# Patient Record
Sex: FEMALE | Race: WHITE | NOT HISPANIC OR LATINO | Employment: FULL TIME | URBAN - NONMETROPOLITAN AREA
[De-identification: names, ages, dates, MRNs, and addresses within clinical notes are randomized per-mention and may not be internally consistent; named-entity substitution may affect disease eponyms.]

---

## 2022-03-25 NOTE — TELEPHONE ENCOUNTER
[facility] for refills of this patients medications:    Resuvastatin 5mg  And Diltiazen ER 180mg

## 2022-03-28 RX ORDER — ROSUVASTATIN CALCIUM 5 MG/1
5 TABLET, COATED ORAL DAILY
Qty: 90 TABLET | Refills: 1 | Status: SHIPPED | OUTPATIENT
Start: 2022-03-28 | End: 2022-03-29 | Stop reason: SDUPTHER

## 2022-03-28 RX ORDER — DILTIAZEM HYDROCHLORIDE 180 MG/1
180 CAPSULE, COATED, EXTENDED RELEASE ORAL DAILY
COMMUNITY
End: 2022-03-28 | Stop reason: SDUPTHER

## 2022-03-28 RX ORDER — DILTIAZEM HYDROCHLORIDE 180 MG/1
180 CAPSULE, COATED, EXTENDED RELEASE ORAL DAILY
Qty: 90 CAPSULE | Refills: 1 | Status: SHIPPED | OUTPATIENT
Start: 2022-03-28 | End: 2022-09-28

## 2022-03-28 RX ORDER — ROSUVASTATIN CALCIUM 5 MG/1
5 TABLET, COATED ORAL DAILY
COMMUNITY
End: 2022-03-28 | Stop reason: SDUPTHER

## 2022-03-29 RX ORDER — ROSUVASTATIN CALCIUM 5 MG/1
5 TABLET, COATED ORAL DAILY
Qty: 90 TABLET | Refills: 1 | Status: SHIPPED | OUTPATIENT
Start: 2022-03-29 | End: 2023-02-01 | Stop reason: SDUPTHER

## 2022-03-29 RX ORDER — ROSUVASTATIN CALCIUM 5 MG/1
5 TABLET, COATED ORAL DAILY
Qty: 90 TABLET | Refills: 1 | Status: CANCELLED | OUTPATIENT
Start: 2022-03-29

## 2022-04-06 ENCOUNTER — TELEPHONE (OUTPATIENT)
Dept: FAMILY MEDICINE CLINIC | Facility: CLINIC | Age: 67
End: 2022-04-06

## 2022-04-06 NOTE — TELEPHONE ENCOUNTER
Patient called today and scheduled her annual physical w/Dr. Vaz on 4/27. She would like to get her bw done on 4/20 so will need an order entered for her appt.

## 2022-04-08 DIAGNOSIS — Z00.00 WELL ADULT EXAM: Primary | ICD-10-CM

## 2022-04-20 ENCOUNTER — LAB (OUTPATIENT)
Dept: FAMILY MEDICINE CLINIC | Facility: CLINIC | Age: 67
End: 2022-04-20

## 2022-04-20 DIAGNOSIS — Z00.00 WELL ADULT EXAM: ICD-10-CM

## 2022-04-20 PROCEDURE — 36415 COLL VENOUS BLD VENIPUNCTURE: CPT | Performed by: FAMILY MEDICINE

## 2022-04-21 LAB
25(OH)D3+25(OH)D2 SERPL-MCNC: 53.9 NG/ML (ref 30–100)
ALBUMIN SERPL-MCNC: 4.8 G/DL (ref 3.8–4.8)
ALBUMIN/GLOB SERPL: 2.2 {RATIO} (ref 1.2–2.2)
ALP SERPL-CCNC: 94 IU/L (ref 44–121)
ALT SERPL-CCNC: 28 IU/L (ref 0–32)
AST SERPL-CCNC: 24 IU/L (ref 0–40)
BASOPHILS # BLD AUTO: 0.1 X10E3/UL (ref 0–0.2)
BASOPHILS NFR BLD AUTO: 1 %
BILIRUB SERPL-MCNC: 0.8 MG/DL (ref 0–1.2)
BUN SERPL-MCNC: 17 MG/DL (ref 8–27)
BUN/CREAT SERPL: 22 (ref 12–28)
CALCIUM SERPL-MCNC: 10.2 MG/DL (ref 8.7–10.3)
CHLORIDE SERPL-SCNC: 101 MMOL/L (ref 96–106)
CHOLEST SERPL-MCNC: 178 MG/DL (ref 100–199)
CO2 SERPL-SCNC: 25 MMOL/L (ref 20–29)
CREAT SERPL-MCNC: 0.78 MG/DL (ref 0.57–1)
EGFRCR SERPLBLD CKD-EPI 2021: 85 ML/MIN/1.73
EOSINOPHIL # BLD AUTO: 0.2 X10E3/UL (ref 0–0.4)
EOSINOPHIL NFR BLD AUTO: 3 %
ERYTHROCYTE [DISTWIDTH] IN BLOOD BY AUTOMATED COUNT: 12.3 % (ref 11.7–15.4)
GLOBULIN SER CALC-MCNC: 2.2 G/DL (ref 1.5–4.5)
GLUCOSE SERPL-MCNC: 104 MG/DL (ref 65–99)
HBA1C MFR BLD: 5.7 % (ref 4.8–5.6)
HCT VFR BLD AUTO: 44.9 % (ref 34–46.6)
HDLC SERPL-MCNC: 63 MG/DL
HGB BLD-MCNC: 15.1 G/DL (ref 11.1–15.9)
IMM GRANULOCYTES # BLD AUTO: 0 X10E3/UL (ref 0–0.1)
IMM GRANULOCYTES NFR BLD AUTO: 1 %
LDLC SERPL CALC-MCNC: 98 MG/DL (ref 0–99)
LYMPHOCYTES # BLD AUTO: 1.7 X10E3/UL (ref 0.7–3.1)
LYMPHOCYTES NFR BLD AUTO: 32 %
MCH RBC QN AUTO: 31.4 PG (ref 26.6–33)
MCHC RBC AUTO-ENTMCNC: 33.6 G/DL (ref 31.5–35.7)
MCV RBC AUTO: 93 FL (ref 79–97)
MONOCYTES # BLD AUTO: 0.5 X10E3/UL (ref 0.1–0.9)
MONOCYTES NFR BLD AUTO: 10 %
NEUTROPHILS # BLD AUTO: 2.7 X10E3/UL (ref 1.4–7)
NEUTROPHILS NFR BLD AUTO: 53 %
PLATELET # BLD AUTO: 399 X10E3/UL (ref 150–450)
POTASSIUM SERPL-SCNC: 4.1 MMOL/L (ref 3.5–5.2)
PROT SERPL-MCNC: 7 G/DL (ref 6–8.5)
RBC # BLD AUTO: 4.81 X10E6/UL (ref 3.77–5.28)
SODIUM SERPL-SCNC: 140 MMOL/L (ref 134–144)
TRIGL SERPL-MCNC: 95 MG/DL (ref 0–149)
TSH SERPL DL<=0.005 MIU/L-ACNC: 3.55 UIU/ML (ref 0.45–4.5)
VIT B12 SERPL-MCNC: 933 PG/ML (ref 232–1245)
VLDLC SERPL CALC-MCNC: 17 MG/DL (ref 5–40)
WBC # BLD AUTO: 5.1 X10E3/UL (ref 3.4–10.8)

## 2022-04-27 ENCOUNTER — OFFICE VISIT (OUTPATIENT)
Dept: FAMILY MEDICINE CLINIC | Facility: CLINIC | Age: 67
End: 2022-04-27

## 2022-04-27 VITALS
TEMPERATURE: 97.7 F | HEIGHT: 65 IN | WEIGHT: 134.9 LBS | OXYGEN SATURATION: 99 % | HEART RATE: 81 BPM | BODY MASS INDEX: 22.48 KG/M2 | DIASTOLIC BLOOD PRESSURE: 72 MMHG | SYSTOLIC BLOOD PRESSURE: 138 MMHG

## 2022-04-27 DIAGNOSIS — G89.29 CHRONIC RUQ PAIN: ICD-10-CM

## 2022-04-27 DIAGNOSIS — R10.11 CHRONIC RUQ PAIN: ICD-10-CM

## 2022-04-27 DIAGNOSIS — Z00.00 ENCOUNTER FOR WELLNESS EXAMINATION IN ADULT: Primary | ICD-10-CM

## 2022-04-27 DIAGNOSIS — R00.0 TACHYCARDIA: ICD-10-CM

## 2022-04-27 DIAGNOSIS — N95.1 MENOPAUSAL SYNDROME: ICD-10-CM

## 2022-04-27 DIAGNOSIS — Z86.16 HISTORY OF COVID-19: ICD-10-CM

## 2022-04-27 DIAGNOSIS — Z12.31 ENCOUNTER FOR SCREENING MAMMOGRAM FOR MALIGNANT NEOPLASM OF BREAST: ICD-10-CM

## 2022-04-27 PROBLEM — H69.83 DYSFUNCTION OF BOTH EUSTACHIAN TUBES: Status: ACTIVE | Noted: 2019-12-03

## 2022-04-27 PROBLEM — H33.103 BILATERAL RETINOSCHISIS: Status: ACTIVE | Noted: 2021-11-19

## 2022-04-27 PROBLEM — H65.92 LEFT SEROUS OTITIS MEDIA: Status: ACTIVE | Noted: 2019-12-03

## 2022-04-27 PROBLEM — H72.92 PERFORATION OF LEFT TYMPANIC MEMBRANE: Status: ACTIVE | Noted: 2019-11-15

## 2022-04-27 PROBLEM — H25.10 NUCLEAR SENILE CATARACT: Status: ACTIVE | Noted: 2021-11-19

## 2022-04-27 PROBLEM — H69.93 DYSFUNCTION OF BOTH EUSTACHIAN TUBES: Status: ACTIVE | Noted: 2019-12-03

## 2022-04-27 PROBLEM — H93.12 TINNITUS OF LEFT EAR: Status: ACTIVE | Noted: 2019-12-03

## 2022-04-27 PROBLEM — H35.373 EPIRETINAL MEMBRANE (ERM) OF BOTH EYES: Status: ACTIVE | Noted: 2021-11-19

## 2022-04-27 PROCEDURE — 99397 PER PM REEVAL EST PAT 65+ YR: CPT | Performed by: FAMILY MEDICINE

## 2022-04-27 PROCEDURE — 99214 OFFICE O/P EST MOD 30 MIN: CPT | Performed by: FAMILY MEDICINE

## 2022-04-27 RX ORDER — CHOLECALCIFEROL (VITAMIN D3) 125 MCG
CAPSULE ORAL
COMMUNITY

## 2022-04-27 RX ORDER — VALSARTAN AND HYDROCHLOROTHIAZIDE 160; 25 MG/1; MG/1
1 TABLET ORAL DAILY
COMMUNITY
Start: 2021-11-12 | End: 2022-05-23 | Stop reason: SDUPTHER

## 2022-04-27 RX ORDER — OMEPRAZOLE 20 MG/1
20 CAPSULE, DELAYED RELEASE ORAL EVERY MORNING
COMMUNITY
Start: 2022-04-22 | End: 2022-05-23 | Stop reason: SDUPTHER

## 2022-04-27 RX ORDER — UBIDECARENONE 100 MG
100 CAPSULE ORAL DAILY
COMMUNITY
End: 2023-02-01

## 2022-04-27 RX ORDER — MULTIPLE VITAMINS W/ MINERALS TAB 9MG-400MCG
1 TAB ORAL DAILY
COMMUNITY

## 2022-04-27 NOTE — PROGRESS NOTES
Patient Name: Lorena Zavala  : 1955   MRN: [id number]     Chief Complaint:    Chief Complaint   Patient presents with   • Annual Exam       History of Present Illness: Lorena Zavala is a 67 y.o. female who is here today for their annual health maintenance and physical. Patient presents for follow-up on chronic medical problems including hypertension, hyperlipidemia and GERD. Patient denies adverse effects of medications, headache, dizziness, chest pain and shortness of breath. Patient complains of palpitations and abdominal pain. Patient is here for monitoring of chronic issues and to go over recent fasting blood work.  Fasting blood work was significant only for being mildly prediabetic, all other labs were normal.  She is due for mammogram and DEXA, up-to-date on colon screening.    Patient reports persistent intermittent right upper quadrant pain that is not associated specifically with eating or certain types of foods.  She has had gallbladder ultrasound, HIDA scan, and CT abdomen pelvis with contrast over the last year all of which were normal.  The pain is intermittent but feels like an aching gnawing type pain in her right upper quadrant that radiates around her right flank.  She denies nausea, vomiting or diarrhea associated with this.    She also reports racing heart 3-4 times monthly that only happens at nighttime.  She states that she will awake from a dream and will feel her heart racing.  When she has looked at her watch her heart rate is in the 120s.  She states that she will get out of bed, go get a drink of water and lay down for about 15 minutes as this resolves.  She has never had a Holter monitor or had a sleep study to rule out sleep apnea although she denies family history of sleep apnea or known apneic spells.             Review of Systems:   Review of Systems   Constitutional: Negative for chills, fatigue and fever.   Respiratory: Negative for cough and shortness of breath.     Cardiovascular: Positive for palpitations. Negative for chest pain.   Gastrointestinal: Positive for abdominal pain. Negative for constipation, diarrhea, nausea and vomiting.   Musculoskeletal: Negative for back pain and myalgias.   Neurological: Negative for dizziness and headache.   Psychiatric/Behavioral: Negative for depressed mood. The patient is not nervous/anxious.        Past Medical History, Social History, Family History and Care Team were all reviewed with patient and updated as appropriate.     Medications:     Current Outpatient Medications:   •  Cholecalciferol (Vitamin D3) 50 MCG (2000 UT) tablet, Take  by mouth., Disp: , Rfl:   •  coenzyme Q10 100 MG capsule, Take 100 mg by mouth Daily., Disp: , Rfl:   •  dilTIAZem CD (CARDIZEM CD) 180 MG 24 hr capsule, Take 1 capsule by mouth Daily., Disp: 90 capsule, Rfl: 1  •  multivitamin with minerals (MULTIVITAMIN ADULTS 50+ PO), Take 1 tablet by mouth Daily., Disp: , Rfl:   •  omeprazole (priLOSEC) 20 MG capsule, Take 20 mg by mouth Every Morning., Disp: , Rfl:   •  rosuvastatin (CRESTOR) 5 MG tablet, Take 1 tablet by mouth Daily., Disp: 90 tablet, Rfl: 1  •  valsartan-hydrochlorothiazide (DIOVAN-HCT) 160-25 MG per tablet, Take 1 tablet by mouth Daily., Disp: , Rfl:     Allergies:   Allergies   Allergen Reactions   • Penicillins Unknown - Low Severity   • Sulfa Antibiotics Other (See Comments)         Depression: PHQ-2 Depression Screening  PHQ-2 Total Score: 0   PHQ-9 Total Score: 0     Intimate partner violence: (Screen on initial visit, pregnant women, women with injuries, older adult with injury or evidence of neglect):  • Violence can be a problem in many people's lives, so I now ask every patient about trauma or abuse they may have experienced in a relationship.  • Stress/Safety - Do you feel safe in your relationship?  • Afraid/Abused - Have you ever been in a relationship where you were threatened, hurt, or afraid?  • Friend/Family - Are your friends  "aware you have been hurt?  • Emergency Plan - Do you have a safe place to go and the resources you need in an emergency?    Osteoporosis:   • Ost menopausal women < 65 with RF (advancing age, previous fracture, glucocorticoid therapy, parental hip fracture, low body weight, current cigarette smoking, excessive alcohol consumption, rheumatoid arthritis, secondary osteoporosis [hypogonadism/premature menopause, malabsorption, chronic liver disease, IBD]).  • All women 65 or older      Physical Exam:  Vital Signs:   Vitals:    04/27/22 1331   BP: 138/72   BP Location: Left arm   Patient Position: Sitting   Cuff Size: Adult   Pulse: 81   Temp: 97.7 °F (36.5 °C)   TempSrc: Temporal   SpO2: 99%   Weight: 61.2 kg (134 lb 14.4 oz)   Height: 163.8 cm (64.5\")     Body mass index is 22.8 kg/m².     Physical Exam  Vitals and nursing note reviewed.   Constitutional:       Appearance: Normal appearance. She is normal weight.   HENT:      Head: Normocephalic and atraumatic.      Right Ear: Tympanic membrane and ear canal normal.      Left Ear: Tympanic membrane and ear canal normal.      Nose: Nose normal.      Mouth/Throat:      Mouth: Mucous membranes are moist.      Pharynx: Oropharynx is clear.   Eyes:      Conjunctiva/sclera: Conjunctivae normal.      Pupils: Pupils are equal, round, and reactive to light.   Cardiovascular:      Rate and Rhythm: Normal rate and regular rhythm.      Heart sounds: Normal heart sounds. No murmur heard.  Pulmonary:      Effort: Pulmonary effort is normal.      Breath sounds: Normal breath sounds. No wheezing, rhonchi or rales.   Abdominal:      General: Bowel sounds are normal.      Palpations: Abdomen is soft.      Tenderness: There is no abdominal tenderness.   Musculoskeletal:         General: Normal range of motion.      Cervical back: Normal range of motion and neck supple.      Right lower leg: No edema.      Left lower leg: No edema.   Lymphadenopathy:      Cervical: No cervical " adenopathy.   Skin:     General: Skin is warm.      Findings: No rash.   Neurological:      General: No focal deficit present.      Mental Status: She is alert and oriented to person, place, and time.      Motor: No weakness.   Psychiatric:         Mood and Affect: Mood normal.         Behavior: Behavior normal.         Procedures      Assessment/Plan:   Diagnoses and all orders for this visit:    1. Encounter for wellness examination in adult (Primary)  Assessment & Plan:  Patient has already had fasting labs.  We will set her up for mammogram and DEXA, up-to-date on colon screening      2. Encounter for screening mammogram for malignant neoplasm of breast  -     Mammo Screening Bilateral With CAD; Future    3. Menopausal syndrome  -     DEXA Bone Density Axial; Future    4. History of COVID-19  -     SARS-CoV-2 Semi-Quant Total Ab; Future    5. Chronic RUQ pain  Assessment & Plan:  Will repeat CT abdomen pelvis with and without contrast to reassess since it has been more than 1 year since her last imaging study.    Orders:  -     CT Abdomen Pelvis With & Without Contrast; Future    6. Tachycardia  Assessment & Plan:  We discussed options including referral to cardiology for Holter monitor as well as sleep study to rule out GABRIELA since symptoms only happen at nighttime.  She would like to hold off on any further work-up at this time.           Follow Up:   Return if symptoms worsen or fail to improve.    Healthcare Maintenance:   Counseling provided on Discussed injury prevention, diet and exercise, safe sexual practices, and screening for common diseases. Encouraged use of sunscreen and seatbelts. Encouraged SBE, avoidance of tobacco, limiting alcohol, and yearly dental and eye exams.   Lorena Zavala voices understanding and acceptance of this advice and will call back with any further questions or concerns. AVS with preventive healthcare tips printed for patient.     Bonita Vaz, DO  [facility]  [facility]

## 2022-04-29 LAB
SARS-COV-2 AB SERPL IA-ACNC: NORMAL U/ML
SARS-COV-2 AB SERPL IA-ACNC: NORMAL U/ML
SARS-COV-2 AB SERPL-IMP: POSITIVE

## 2022-05-23 RX ORDER — OMEPRAZOLE 20 MG/1
20 CAPSULE, DELAYED RELEASE ORAL EVERY MORNING
Qty: 90 CAPSULE | Refills: 1 | Status: SHIPPED | OUTPATIENT
Start: 2022-05-23 | End: 2022-11-14

## 2022-05-23 RX ORDER — VALSARTAN AND HYDROCHLOROTHIAZIDE 160; 25 MG/1; MG/1
1 TABLET ORAL DAILY
Qty: 90 TABLET | Refills: 1 | Status: SHIPPED | OUTPATIENT
Start: 2022-05-23 | End: 2022-12-01 | Stop reason: SDUPTHER

## 2022-09-28 RX ORDER — DILTIAZEM HYDROCHLORIDE 180 MG/1
CAPSULE, EXTENDED RELEASE ORAL
Qty: 90 CAPSULE | Refills: 1 | Status: SHIPPED | OUTPATIENT
Start: 2022-09-28 | End: 2023-01-03

## 2022-11-14 RX ORDER — OMEPRAZOLE 20 MG/1
20 CAPSULE, DELAYED RELEASE ORAL EVERY MORNING
Qty: 90 CAPSULE | Refills: 1 | Status: SHIPPED | OUTPATIENT
Start: 2022-11-14 | End: 2023-02-01 | Stop reason: SDUPTHER

## 2022-12-01 RX ORDER — VALSARTAN AND HYDROCHLOROTHIAZIDE 160; 25 MG/1; MG/1
1 TABLET ORAL DAILY
Qty: 90 TABLET | Refills: 1 | Status: SHIPPED | OUTPATIENT
Start: 2022-12-01 | End: 2023-02-01 | Stop reason: SDUPTHER

## 2023-01-03 RX ORDER — DILTIAZEM HYDROCHLORIDE 180 MG/1
CAPSULE, EXTENDED RELEASE ORAL
Qty: 90 CAPSULE | Refills: 1 | Status: SHIPPED | OUTPATIENT
Start: 2023-01-03 | End: 2023-02-01 | Stop reason: SDUPTHER

## 2023-02-01 ENCOUNTER — OFFICE VISIT (OUTPATIENT)
Dept: FAMILY MEDICINE CLINIC | Facility: CLINIC | Age: 68
End: 2023-02-01
Payer: MEDICARE

## 2023-02-01 VITALS
DIASTOLIC BLOOD PRESSURE: 80 MMHG | TEMPERATURE: 97.7 F | BODY MASS INDEX: 22.42 KG/M2 | HEART RATE: 89 BPM | OXYGEN SATURATION: 99 % | WEIGHT: 134.6 LBS | SYSTOLIC BLOOD PRESSURE: 138 MMHG | HEIGHT: 65 IN

## 2023-02-01 DIAGNOSIS — R10.11 RIGHT UPPER QUADRANT ABDOMINAL PAIN: Primary | ICD-10-CM

## 2023-02-01 DIAGNOSIS — H65.93 MIDDLE EAR EFFUSION, BILATERAL: ICD-10-CM

## 2023-02-01 DIAGNOSIS — G89.29 CHRONIC RUQ PAIN: ICD-10-CM

## 2023-02-01 DIAGNOSIS — R10.11 CHRONIC RUQ PAIN: ICD-10-CM

## 2023-02-01 PROCEDURE — 99214 OFFICE O/P EST MOD 30 MIN: CPT | Performed by: FAMILY MEDICINE

## 2023-02-01 PROCEDURE — 96372 THER/PROPH/DIAG INJ SC/IM: CPT | Performed by: FAMILY MEDICINE

## 2023-02-01 RX ORDER — DILTIAZEM HYDROCHLORIDE 180 MG/1
180 CAPSULE, EXTENDED RELEASE ORAL DAILY
Qty: 90 CAPSULE | Refills: 3 | Status: SHIPPED | OUTPATIENT
Start: 2023-02-01

## 2023-02-01 RX ORDER — VALSARTAN AND HYDROCHLOROTHIAZIDE 160; 25 MG/1; MG/1
1 TABLET ORAL DAILY
Qty: 90 TABLET | Refills: 3 | Status: SHIPPED | OUTPATIENT
Start: 2023-02-01

## 2023-02-01 RX ORDER — ROSUVASTATIN CALCIUM 5 MG/1
5 TABLET, COATED ORAL DAILY
Qty: 90 TABLET | Refills: 3 | Status: SHIPPED | OUTPATIENT
Start: 2023-02-01

## 2023-02-01 RX ORDER — TRIAMCINOLONE ACETONIDE 40 MG/ML
80 INJECTION, SUSPENSION INTRA-ARTICULAR; INTRAMUSCULAR ONCE
Status: COMPLETED | OUTPATIENT
Start: 2023-02-01 | End: 2023-02-01

## 2023-02-01 RX ORDER — OMEPRAZOLE 20 MG/1
20 CAPSULE, DELAYED RELEASE ORAL EVERY MORNING
Qty: 90 CAPSULE | Refills: 3 | Status: SHIPPED | OUTPATIENT
Start: 2023-02-01

## 2023-02-01 RX ORDER — METHYLPREDNISOLONE 4 MG/1
TABLET ORAL
Qty: 21 TABLET | Refills: 0 | Status: SHIPPED | OUTPATIENT
Start: 2023-02-01

## 2023-02-01 RX ADMIN — TRIAMCINOLONE ACETONIDE 80 MG: 40 INJECTION, SUSPENSION INTRA-ARTICULAR; INTRAMUSCULAR at 10:40

## 2023-02-01 NOTE — ASSESSMENT & PLAN NOTE
Patient received Kenalog in office and Rx Medrol pack.  She will call if symptoms are persistent or worsening

## 2023-02-01 NOTE — PROGRESS NOTES
Date: 2023   Patient Name: Lorena Zavala  : 1955   MRN: [id number]     Chief Complaint:    Chief Complaint   Patient presents with   • Abdominal Pain     Right upper quadrant pain        History of Present Illness: Lorena Zavala is a 67 y.o. female who is here today for Chronic right upper quadrant abdominal pain.  This has been going on for about 2 years now.  She has had extensive work-up including CT abdomen pelvis, abdominal ultrasound, and MRI of abdomen which have all been normal.  She continues to have right upper quadrant pain which has now progressed and radiates to her back at times.  This remains dull and is never intolerable.  She has had no changes in bowel movements and denies uncontrolled acid reflux.  She has seen GI as well who has been unable to determine an etiology.  She would like to discuss what other imaging options are available for work-up.    She also complains today of bilateral otalgia right worse than left.  She has history of middle ear effusion and tympanic membrane rupture.  She has had a viral upper respiratory infection for about 10 days which is when her otalgia started.           Review of Systems:   Review of Systems   Constitutional: Negative for chills, fatigue and fever.   HENT: Positive for ear pain, postnasal drip, sore throat and swollen glands.    Respiratory: Negative for cough and shortness of breath.    Cardiovascular: Negative for chest pain and palpitations.   Gastrointestinal: Positive for abdominal pain. Negative for constipation, diarrhea, nausea and vomiting.   Musculoskeletal: Negative for back pain and myalgias.   Neurological: Negative for dizziness and headache.   Psychiatric/Behavioral: Negative for depressed mood. The patient is not nervous/anxious.        Past Medical History:   Past Medical History:   Diagnosis Date   • Sanders syndrome    • Hyperlipidemia    • Hypertension        Past Surgical History:   Past Surgical History:    Procedure Laterality Date   • APPENDECTOMY     • BUNIONECTOMY     •  SECTION     • EAR TUBES     • MOLE REMOVAL     • TONSILLECTOMY         Family History:   Family History   Problem Relation Age of Onset   • Stroke Mother 80   • Heart attack Father 55   • Heart disease Father    • Stroke Maternal Grandmother    • Heart attack Maternal Grandfather    • Diabetes Paternal Grandmother    • Stroke Paternal Grandfather        Social History:   Social History     Socioeconomic History   • Marital status:      Spouse name: Ed   • Number of children: 1   Tobacco Use   • Smoking status: Never   • Smokeless tobacco: Never   Vaping Use   • Vaping Use: Never used   Substance and Sexual Activity   • Alcohol use: Yes     Alcohol/week: 2.0 standard drinks     Types: 1 Glasses of wine, 1 Cans of beer per week   • Drug use: Never   • Sexual activity: Defer       Medications:     Current Outpatient Medications:   •  Cholecalciferol (Vitamin D3) 50 MCG (2000) tablet, Take  by mouth., Disp: , Rfl:   •  dilTIAZem XR (DILACOR XR) 180 MG 24 hr capsule, Take 1 capsule by mouth Daily., Disp: 90 capsule, Rfl: 3  •  multivitamin with minerals tablet tablet, Take 1 tablet by mouth Daily., Disp: , Rfl:   •  omeprazole (priLOSEC) 20 MG capsule, Take 1 capsule by mouth Every Morning., Disp: 90 capsule, Rfl: 3  •  rosuvastatin (CRESTOR) 5 MG tablet, Take 1 tablet by mouth Daily., Disp: 90 tablet, Rfl: 3  •  valsartan-hydrochlorothiazide (DIOVAN-HCT) 160-25 MG per tablet, Take 1 tablet by mouth Daily., Disp: 90 tablet, Rfl: 3  •  methylPREDNISolone (MEDROL) 4 MG dose pack, Take as directed on package instructions., Disp: 21 tablet, Rfl: 0    Current Facility-Administered Medications:   •  triamcinolone acetonide (KENALOG-40) injection 80 mg, 80 mg, Intramuscular, Once, Bonita Vaz,     Allergies:   Allergies   Allergen Reactions   • Penicillins Unknown - Low Severity   • Sulfa Antibiotics Other (See Comments)  "        Physical Exam:  Vital Signs:   Vitals:    02/01/23 0933 02/01/23 1021   BP: 152/88 138/80   BP Location: Left arm    Patient Position: Sitting    Cuff Size: Adult    Pulse: 89    Temp: 97.7 °F (36.5 °C)    TempSrc: Temporal    SpO2: 99%    Weight: 61.1 kg (134 lb 9.6 oz)    Height: 163.8 cm (64.5\")      Body mass index is 22.75 kg/m².     Physical Exam  Vitals and nursing note reviewed.   Constitutional:       Appearance: Normal appearance.   HENT:      Right Ear: A middle ear effusion is present.      Left Ear: A middle ear effusion is present.   Cardiovascular:      Rate and Rhythm: Normal rate and regular rhythm.      Heart sounds: Normal heart sounds. No murmur heard.  Pulmonary:      Effort: Pulmonary effort is normal.      Breath sounds: Normal breath sounds. No wheezing.   Abdominal:      General: Bowel sounds are normal. There is no distension.      Palpations: Abdomen is soft.      Tenderness: There is abdominal tenderness.   Skin:     General: Skin is warm.   Neurological:      Mental Status: She is alert and oriented to person, place, and time. Mental status is at baseline.   Psychiatric:         Mood and Affect: Mood normal.         Behavior: Behavior normal.           Assessment/Plan:   Diagnoses and all orders for this visit:    1. Right upper quadrant abdominal pain (Primary)  Assessment & Plan:  Etiology remains unclear, we will move forward with CT angiogram for further evaluation    Orders:  -     CT Angiogram Abdomen Pelvis With & Without Contrast; Future    2. Middle ear effusion, bilateral  Assessment & Plan:  Patient received Kenalog in office and Rx Medrol pack.  She will call if symptoms are persistent or worsening    Orders:  -     methylPREDNISolone (MEDROL) 4 MG dose pack; Take as directed on package instructions.  Dispense: 21 tablet; Refill: 0  -     triamcinolone acetonide (KENALOG-40) injection 80 mg    Other orders  -     dilTIAZem XR (DILACOR XR) 180 MG 24 hr capsule; Take 1 " capsule by mouth Daily.  Dispense: 90 capsule; Refill: 3  -     omeprazole (priLOSEC) 20 MG capsule; Take 1 capsule by mouth Every Morning.  Dispense: 90 capsule; Refill: 3  -     rosuvastatin (CRESTOR) 5 MG tablet; Take 1 tablet by mouth Daily.  Dispense: 90 tablet; Refill: 3  -     valsartan-hydrochlorothiazide (DIOVAN-HCT) 160-25 MG per tablet; Take 1 tablet by mouth Daily.  Dispense: 90 tablet; Refill: 3         Follow Up:   Return if symptoms worsen or fail to improve.    Bonita Vaz DO  [facility] [place] [facility]

## 2023-02-03 ENCOUNTER — TELEPHONE (OUTPATIENT)
Dept: FAMILY MEDICINE CLINIC | Facility: CLINIC | Age: 68
End: 2023-02-03
Payer: MEDICARE

## 2023-02-03 DIAGNOSIS — R10.11 CHRONIC RUQ PAIN: Primary | ICD-10-CM

## 2023-02-03 DIAGNOSIS — G89.29 CHRONIC RUQ PAIN: Primary | ICD-10-CM

## 2023-02-03 NOTE — TELEPHONE ENCOUNTER
Patient was scheduled for Ct angiogram for Wednesday 2/8/2023.  She said they are requesting lab work(creatinine) to be done prior. There are no active orders in patient's chart.  She is requesting that Dr. Vaz enter lab orders so that she can get those done prior to her appointment Wednesday.  She would like a call back. Ph: [phone]

## 2023-02-06 PROCEDURE — 36415 COLL VENOUS BLD VENIPUNCTURE: CPT | Performed by: FAMILY MEDICINE

## 2023-02-07 ENCOUNTER — TELEPHONE (OUTPATIENT)
Dept: FAMILY MEDICINE CLINIC | Facility: CLINIC | Age: 68
End: 2023-02-07

## 2023-02-07 ENCOUNTER — TELEPHONE (OUTPATIENT)
Dept: FAMILY MEDICINE CLINIC | Facility: CLINIC | Age: 68
End: 2023-02-07
Payer: MEDICARE

## 2023-02-07 LAB
BUN SERPL-MCNC: 20 MG/DL (ref 8–27)
BUN/CREAT SERPL: 33 (ref 12–28)
CALCIUM SERPL-MCNC: 10.1 MG/DL (ref 8.7–10.3)
CHLORIDE SERPL-SCNC: 101 MMOL/L (ref 96–106)
CO2 SERPL-SCNC: 23 MMOL/L (ref 20–29)
CREAT SERPL-MCNC: 0.61 MG/DL (ref 0.57–1)
EGFRCR SERPLBLD CKD-EPI 2021: 99 ML/MIN/1.73
GLUCOSE SERPL-MCNC: 100 MG/DL (ref 70–99)
POTASSIUM SERPL-SCNC: 4.4 MMOL/L (ref 3.5–5.2)
SODIUM SERPL-SCNC: 139 MMOL/L (ref 134–144)

## 2023-03-07 DIAGNOSIS — R92.8 ABNORMAL MAMMOGRAM OF RIGHT BREAST: Primary | ICD-10-CM

## 2023-03-08 ENCOUNTER — TELEPHONE (OUTPATIENT)
Dept: FAMILY MEDICINE CLINIC | Facility: CLINIC | Age: 68
End: 2023-03-08

## 2023-03-16 ENCOUNTER — TELEPHONE (OUTPATIENT)
Dept: FAMILY MEDICINE CLINIC | Facility: CLINIC | Age: 68
End: 2023-03-16

## 2023-03-16 NOTE — TELEPHONE ENCOUNTER
Caller: Lorena Zavala    Relationship: Self    Best call back number  [phone]    What was the call regarding:   PATIENT WOULD LIKE A CALL BACK REGARDING THE STATUS OF HER MAMMOGRAM AND GETTING A APPOINTMENT SCHEDULED     Do you require a callback: YES     
Patient refused/Other (specify)

## 2023-03-22 DIAGNOSIS — R92.8 ABNORMAL MAMMOGRAM OF RIGHT BREAST: Primary | ICD-10-CM

## 2023-05-24 ENCOUNTER — OFFICE VISIT (OUTPATIENT)
Dept: FAMILY MEDICINE CLINIC | Facility: CLINIC | Age: 68
End: 2023-05-24
Payer: MEDICARE

## 2023-05-24 VITALS
HEART RATE: 61 BPM | BODY MASS INDEX: 21.99 KG/M2 | WEIGHT: 132 LBS | SYSTOLIC BLOOD PRESSURE: 130 MMHG | OXYGEN SATURATION: 98 % | DIASTOLIC BLOOD PRESSURE: 80 MMHG | HEIGHT: 65 IN

## 2023-05-24 DIAGNOSIS — Z00.00 ENCOUNTER FOR WELLNESS EXAMINATION IN ADULT: Primary | ICD-10-CM

## 2023-05-24 DIAGNOSIS — Z09 ENCOUNTER FOR FOLLOW-UP EXAMINATION AFTER COMPLETED TREATMENT FOR CONDITIONS OTHER THAN MALIGNANT NEOPLASM: ICD-10-CM

## 2023-05-24 DIAGNOSIS — E55.9 VITAMIN D DEFICIENCY: ICD-10-CM

## 2023-05-24 NOTE — PROGRESS NOTES
The ABCs of the Annual Wellness Visit  Welcome to Medicare Wellness Visit    Subjective     Lorena Zavala is a 68 y.o. female who presents for an Initial Medicare Wellness Visit.  Chronic medical conditions include hyperlipidemia, hypertension, GERD, and vitamin D deficiency. She is due for DEXA.     The following portions of the patient's history were reviewed and   updated as appropriate: allergies, current medications, past family history, past medical history, past social history, past surgical history and problem list.     Compared to one year ago, the patient feels her physical   health is the same.    Compared to one year ago, the patient feels her mental   health is the same.    Recent Hospitalizations:  She was not admitted to the hospital during the last year.       Current Medical Providers:  Patient Care Team:  Bonita Vaz,  as PCP - General (Family Medicine)    Outpatient Medications Prior to Visit   Medication Sig Dispense Refill   • Cholecalciferol (Vitamin D3) 50 MCG (2000 UT) tablet Take  by mouth.     • dilTIAZem XR (DILACOR XR) 180 MG 24 hr capsule Take 1 capsule by mouth Daily. 90 capsule 3   • multivitamin with minerals tablet tablet Take 1 tablet by mouth Daily.     • omeprazole (priLOSEC) 20 MG capsule Take 1 capsule by mouth Every Morning. 90 capsule 3   • rosuvastatin (CRESTOR) 5 MG tablet Take 1 tablet by mouth Daily. 90 tablet 3   • valsartan-hydrochlorothiazide (DIOVAN-HCT) 160-25 MG per tablet Take 1 tablet by mouth Daily. 90 tablet 3   • methylPREDNISolone (MEDROL) 4 MG dose pack Take as directed on package instructions. 21 tablet 0     No facility-administered medications prior to visit.       No opioid medication identified on active medication list. I have reviewed chart for other potential  high risk medication/s and harmful drug interactions in the elderly.          Aspirin is not on active medication list.  Aspirin use is not indicated based on review of  "current medical condition/s. Risk of harm outweighs potential benefits.  .    Patient Active Problem List   Diagnosis   • Dysfunction of both eustachian tubes   • Epiretinal membrane (ERM) of both eyes   • Bilateral retinoschisis   • Left serous otitis media   • Nuclear senile cataract   • Perforation of left tympanic membrane   • Tinnitus of left ear   • Encounter for wellness examination in adult   • Encounter for screening mammogram for malignant neoplasm of breast   • Menopausal syndrome   • History of COVID-19   • Right upper quadrant abdominal pain   • Tachycardia   • Middle ear effusion, bilateral     Advance Care Planning   Advance Care Planning     Advance Directive is not on file.  ACP discussion was held with the patient during this visit. Patient has an advance directive (not in EMR), copy requested.       Objective    Vitals:    05/24/23 1041   BP: 130/80   BP Location: Right arm   Patient Position: Sitting   Cuff Size: Adult   Pulse: 61   SpO2: 98%   Weight: 59.9 kg (132 lb)   Height: 163.8 cm (64.5\")     Estimated body mass index is 22.31 kg/m² as calculated from the following:    Height as of this encounter: 163.8 cm (64.5\").    Weight as of this encounter: 59.9 kg (132 lb).    BMI is within normal parameters. No other follow-up for BMI required.      Does the patient have evidence of cognitive impairment?   No          HEALTH RISK ASSESSMENT    Smoking Status:  Social History     Tobacco Use   Smoking Status Never   Smokeless Tobacco Never     Alcohol Consumption:  Social History     Substance and Sexual Activity   Alcohol Use Yes   • Alcohol/week: 2.0 standard drinks   • Types: 1 Glasses of wine, 1 Cans of beer per week     Fall Risk Screen:    STEADI Fall Risk Assessment was completed, and patient is at LOW risk for falls.Assessment completed on:5/24/2023    Depression Screen:       5/24/2023    10:42 AM   PHQ-2/PHQ-9 Depression Screening   Little Interest or Pleasure in Doing Things 0-->not at " all   Feeling Down, Depressed or Hopeless 0-->not at all   PHQ-9: Brief Depression Severity Measure Score 0       Health Habits and Functional and Cognitive Screenin/24/2023    10:44 AM   Functional & Cognitive Status   Do you have difficulty preparing food and eating? No   Do you have difficulty bathing yourself, getting dressed or grooming yourself? No   Do you have difficulty using the toilet? No   Do you have difficulty moving around from place to place? No   Do you have trouble with steps or getting out of a bed or a chair? No   Current Diet Well Balanced Diet   Dental Exam Up to date   Eye Exam Not up to date   Exercise (times per week) 3 times per week   Current Exercises Include Walking   Do you need help using the phone?  No   Are you deaf or do you have serious difficulty hearing?  No   Do you need help with transportation? No   Do you need help shopping? No   Do you need help with housework?  No   Do you need help with laundry? No   Do you need help taking your medications? No   Do you need help managing money? No   Do you ever drive or ride in a car without wearing a seat belt? No   Have you felt unusual stress, anger or loneliness in the last month? Yes   Who do you live with? Spouse   If you need help, do you have trouble finding someone available to you? No   Have you been bothered in the last four weeks by sexual problems? No   Do you have difficulty concentrating, remembering or making decisions? No       Age-appropriate Screening Schedule:  Refer to the list below for future screening recommendations based on patient's age, sex and/or medical conditions. Orders for these recommended tests are listed in the plan section. The patient has been provided with a written plan.    Health Maintenance   Topic Date Due   • DXA SCAN  Never done   • HEPATITIS C SCREENING  Never done   • ANNUAL WELLNESS VISIT  Never done   • LIPID PANEL  2023   • ZOSTER VACCINE (1 of 2) 2023 (Originally  "4/25/2007)   • COVID-19 Vaccine (1) 02/07/2024 (Originally 1955)   • Pneumococcal Vaccine 65+ (1 - PCV) 05/24/2024 (Originally 4/25/2022)   • INFLUENZA VACCINE  08/01/2023   • MAMMOGRAM  04/06/2025   • COLORECTAL CANCER SCREENING  09/29/2031   • TDAP/TD VACCINES  Discontinued          CMS Preventative Services Quick Reference  Risk Factors Identified During Encounter    None Identified    The above risks/problems have been discussed with the patient.  Pertinent information has been shared with the patient in the After Visit Summary.  An After Visit Summary and PPPS were made available to the patient.  Diagnoses and all orders for this visit:    1. Encounter for wellness examination in adult (Primary)  Assessment & Plan:  Fasting labs today, DEXA ordered    Orders:  -     Hemoglobin A1c; Future  -     CBC Auto Differential; Future  -     Comprehensive Metabolic Panel; Future  -     Lipid Panel; Future  -     TSH; Future  -     T4, Free; Future    2. Vitamin D deficiency  -     Vitamin D,25-Hydroxy; Future    3. Encounter for follow-up examination after completed treatment for conditions other than malignant neoplasm  -     DEXA Bone Density Axial; Future    Follow Up:  Next Medicare Wellness visit to be scheduled in 1 year.            Review of Systems   Constitutional: Negative for chills, fatigue and fever.   Respiratory: Negative for cough, shortness of breath and wheezing.    Cardiovascular: Negative for chest pain, palpitations and leg swelling.   Gastrointestinal: Negative for abdominal pain, constipation, diarrhea, nausea and vomiting.   Skin: Negative for rash.   Neurological: Negative for dizziness and weakness.   Psychiatric/Behavioral: The patient is not nervous/anxious.        Objective   Vital Signs:  /80 (BP Location: Right arm, Patient Position: Sitting, Cuff Size: Adult)   Pulse 61   Ht 163.8 cm (64.5\")   Wt 59.9 kg (132 lb)   SpO2 98%   BMI 22.31 kg/m²     Physical Exam  Vitals and " nursing note reviewed.   Constitutional:       Appearance: Normal appearance. She is normal weight.   HENT:      Head: Normocephalic and atraumatic.      Right Ear: Tympanic membrane and ear canal normal.      Left Ear: Tympanic membrane and ear canal normal.      Nose: Nose normal.      Mouth/Throat:      Mouth: Mucous membranes are moist.      Pharynx: Oropharynx is clear.   Eyes:      Conjunctiva/sclera: Conjunctivae normal.      Pupils: Pupils are equal, round, and reactive to light.   Cardiovascular:      Rate and Rhythm: Normal rate and regular rhythm.      Heart sounds: Normal heart sounds. No murmur heard.  Pulmonary:      Effort: Pulmonary effort is normal.      Breath sounds: Normal breath sounds. No wheezing, rhonchi or rales.   Abdominal:      General: Bowel sounds are normal.      Palpations: Abdomen is soft.      Tenderness: There is no abdominal tenderness.   Musculoskeletal:         General: Normal range of motion.      Cervical back: Normal range of motion and neck supple.      Right lower leg: No edema.      Left lower leg: No edema.   Lymphadenopathy:      Cervical: No cervical adenopathy.   Skin:     General: Skin is warm.      Findings: No rash.   Neurological:      General: No focal deficit present.      Mental Status: She is alert and oriented to person, place, and time.      Motor: No weakness.   Psychiatric:         Mood and Affect: Mood normal.         Behavior: Behavior normal.                         Assessment and Plan   Diagnoses and all orders for this visit:    1. Encounter for wellness examination in adult (Primary)  Assessment & Plan:  Fasting labs today, DEXA ordered    Orders:  -     Hemoglobin A1c; Future  -     CBC Auto Differential; Future  -     Comprehensive Metabolic Panel; Future  -     Lipid Panel; Future  -     TSH; Future  -     T4, Free; Future    2. Vitamin D deficiency  -     Vitamin D,25-Hydroxy; Future    3. Encounter for follow-up examination after completed  treatment for conditions other than malignant neoplasm  -     DEXA Bone Density Axial; Future           Follow Up   Return in about 1 year (around 5/24/2024) for Annual physical.  Patient was given instructions and counseling regarding her condition or for health maintenance advice. Please see specific information pulled into the AVS if appropriate.

## 2023-06-01 LAB
25(OH)D3+25(OH)D2 SERPL-MCNC: 60.5 NG/ML (ref 30–100)
ALBUMIN SERPL-MCNC: 4.7 G/DL (ref 3.8–4.8)
ALBUMIN/GLOB SERPL: 2.2 {RATIO} (ref 1.2–2.2)
ALP SERPL-CCNC: 91 IU/L (ref 44–121)
ALT SERPL-CCNC: 20 IU/L (ref 0–32)
AST SERPL-CCNC: 20 IU/L (ref 0–40)
BASOPHILS # BLD AUTO: 0.1 X10E3/UL (ref 0–0.2)
BASOPHILS NFR BLD AUTO: 1 %
BILIRUB SERPL-MCNC: 1.2 MG/DL (ref 0–1.2)
BUN SERPL-MCNC: 14 MG/DL (ref 8–27)
BUN/CREAT SERPL: 17 (ref 12–28)
CALCIUM SERPL-MCNC: 10.3 MG/DL (ref 8.7–10.3)
CHLORIDE SERPL-SCNC: 99 MMOL/L (ref 96–106)
CHOLEST SERPL-MCNC: 191 MG/DL (ref 100–199)
CO2 SERPL-SCNC: 25 MMOL/L (ref 20–29)
CREAT SERPL-MCNC: 0.82 MG/DL (ref 0.57–1)
EGFRCR SERPLBLD CKD-EPI 2021: 79 ML/MIN/1.73
EOSINOPHIL # BLD AUTO: 0.1 X10E3/UL (ref 0–0.4)
EOSINOPHIL NFR BLD AUTO: 1 %
ERYTHROCYTE [DISTWIDTH] IN BLOOD BY AUTOMATED COUNT: 12.5 % (ref 11.7–15.4)
GLOBULIN SER CALC-MCNC: 2.1 G/DL (ref 1.5–4.5)
GLUCOSE SERPL-MCNC: 102 MG/DL (ref 70–99)
HBA1C MFR BLD: 5.6 % (ref 4.8–5.6)
HCT VFR BLD AUTO: 42.2 % (ref 34–46.6)
HDLC SERPL-MCNC: 78 MG/DL
HGB BLD-MCNC: 14.4 G/DL (ref 11.1–15.9)
IMM GRANULOCYTES # BLD AUTO: 0 X10E3/UL (ref 0–0.1)
IMM GRANULOCYTES NFR BLD AUTO: 0 %
LDLC SERPL CALC-MCNC: 99 MG/DL (ref 0–99)
LYMPHOCYTES # BLD AUTO: 1.4 X10E3/UL (ref 0.7–3.1)
LYMPHOCYTES NFR BLD AUTO: 26 %
MCH RBC QN AUTO: 32.4 PG (ref 26.6–33)
MCHC RBC AUTO-ENTMCNC: 34.1 G/DL (ref 31.5–35.7)
MCV RBC AUTO: 95 FL (ref 79–97)
MONOCYTES # BLD AUTO: 0.6 X10E3/UL (ref 0.1–0.9)
MONOCYTES NFR BLD AUTO: 12 %
NEUTROPHILS # BLD AUTO: 3.3 X10E3/UL (ref 1.4–7)
NEUTROPHILS NFR BLD AUTO: 60 %
PLATELET # BLD AUTO: 381 X10E3/UL (ref 150–450)
POTASSIUM SERPL-SCNC: 4.4 MMOL/L (ref 3.5–5.2)
PROT SERPL-MCNC: 6.8 G/DL (ref 6–8.5)
RBC # BLD AUTO: 4.44 X10E6/UL (ref 3.77–5.28)
SODIUM SERPL-SCNC: 140 MMOL/L (ref 134–144)
T4 FREE SERPL-MCNC: 1.6 NG/DL (ref 0.82–1.77)
TRIGL SERPL-MCNC: 78 MG/DL (ref 0–149)
TSH SERPL DL<=0.005 MIU/L-ACNC: 2.32 UIU/ML (ref 0.45–4.5)
VLDLC SERPL CALC-MCNC: 14 MG/DL (ref 5–40)
WBC # BLD AUTO: 5.5 X10E3/UL (ref 3.4–10.8)

## 2023-07-18 PROBLEM — M79.672 LEFT FOOT PAIN: Status: ACTIVE | Noted: 2023-07-18

## 2023-07-18 PROBLEM — M25.572 ACUTE LEFT ANKLE PAIN: Status: ACTIVE | Noted: 2023-07-18

## 2023-07-19 ENCOUNTER — TELEPHONE (OUTPATIENT)
Dept: FAMILY MEDICINE CLINIC | Facility: CLINIC | Age: 68
End: 2023-07-19

## 2023-07-19 NOTE — TELEPHONE ENCOUNTER
Caller: Lorena Zavala    Relationship: Self    Best call back number: [phone]     What test was performed:  XR FOOT 3+ VIEW LEFT   XR ANKLE 3+ VIEW LEFT     When was the test performed: 07/18/2023    Where was the test performed: [facility]     Additional notes:   PATIENT WOULD LIKE A CALL BACK REGARDING THE RESULTS OF HER XR OF THE FOOT AND ANKLE DONE ON 07/18/2023

## 2023-10-03 DIAGNOSIS — R00.0 TACHYCARDIA: Primary | ICD-10-CM

## 2024-02-08 ENCOUNTER — OFFICE VISIT (OUTPATIENT)
Dept: FAMILY MEDICINE CLINIC | Facility: CLINIC | Age: 69
End: 2024-02-08
Payer: MEDICARE

## 2024-02-08 VITALS
BODY MASS INDEX: 21.83 KG/M2 | HEART RATE: 86 BPM | DIASTOLIC BLOOD PRESSURE: 70 MMHG | WEIGHT: 131 LBS | OXYGEN SATURATION: 99 % | TEMPERATURE: 96.8 F | HEIGHT: 65 IN | SYSTOLIC BLOOD PRESSURE: 130 MMHG

## 2024-02-08 DIAGNOSIS — Z01.818 PREOP EXAMINATION: Primary | ICD-10-CM

## 2024-02-08 PROCEDURE — 99213 OFFICE O/P EST LOW 20 MIN: CPT | Performed by: PHYSICIAN ASSISTANT

## 2024-02-08 PROCEDURE — 1160F RVW MEDS BY RX/DR IN RCRD: CPT | Performed by: PHYSICIAN ASSISTANT

## 2024-02-08 PROCEDURE — 1159F MED LIST DOCD IN RCRD: CPT | Performed by: PHYSICIAN ASSISTANT

## 2024-02-08 PROCEDURE — 93000 ELECTROCARDIOGRAM COMPLETE: CPT | Performed by: PHYSICIAN ASSISTANT

## 2024-02-08 RX ORDER — CHLORAL HYDRATE 500 MG
CAPSULE ORAL
COMMUNITY
Start: 2023-11-10

## 2024-02-08 NOTE — PROGRESS NOTES
"Chief Complaint  Pre-op Exam    Subjective          Lorena Zavala presents to [facility]    History of Present Illness patient is here today for a preop evaluation prior to having a left tympanoplasty with ossiculoplasty, fascia graft, cartilage graft, and bilateral eustachian tube dysfunction.  This is being done by Dr. Iban Romano.  She has blood work scheduled at [facility] but also needs an EKG.    Objective   Vital Signs:   /70 (BP Location: Left arm, Patient Position: Sitting, Cuff Size: Adult)   Pulse 86   Temp 96.8 °F (36 °C) (Temporal)   Ht 163.8 cm (64.5\")   Wt 59.4 kg (131 lb)   SpO2 99%   BMI 22.14 kg/m²     Body mass index is 22.14 kg/m².    Review of Systems   Constitutional: Negative.  Negative for chills, fatigue and fever.   HENT: Negative.     Eyes: Negative.    Respiratory: Negative.  Negative for cough, chest tightness, shortness of breath and wheezing.    Cardiovascular: Negative.  Negative for chest pain, palpitations and leg swelling.   Gastrointestinal: Negative.    Endocrine: Negative.    Genitourinary: Negative.    Musculoskeletal: Negative.  Negative for back pain and myalgias.   Skin: Negative.    Allergic/Immunologic: Negative.    Neurological:  Negative for dizziness and headache.   Hematological: Negative.    Psychiatric/Behavioral: Negative.  Negative for depressed mood. The patient is not nervous/anxious.        Past History:  Medical History: has a past medical history of Sanders syndrome, Hyperlipidemia, and Hypertension.   Surgical History: has a past surgical history that includes Appendectomy; Tonsillectomy; Ear Tubes Removal; Bunionectomy; Mole removal; and  section.   Family History: family history includes Diabetes in her paternal grandmother; Heart attack in her maternal grandfather; Heart attack (age of onset: 55) in her father; Heart disease in her father; Stroke in her maternal grandmother and paternal grandfather; " Stroke (age of onset: 80) in her mother.   Social History: reports that she has never smoked. She has never used smokeless tobacco. She reports current alcohol use of about 2.0 standard drinks of alcohol per week. She reports that she does not use drugs.      Current Outpatient Medications:     Cholecalciferol (Vitamin D3) 50 MCG (2000 UT) tablet, Take  by mouth., Disp: , Rfl:     dilTIAZem XR (DILACOR XR) 180 MG 24 hr capsule, Take 1 capsule by mouth Daily., Disp: 90 capsule, Rfl: 3    multivitamin with minerals tablet tablet, Take 1 tablet by mouth Daily., Disp: , Rfl:     Omega-3 Fatty Acids (fish oil) 1000 MG capsule capsule, , Disp: , Rfl:     omeprazole (priLOSEC) 20 MG capsule, Take 1 capsule by mouth Every Morning., Disp: 90 capsule, Rfl: 3    rosuvastatin (CRESTOR) 5 MG tablet, Take 1 tablet by mouth Daily., Disp: 90 tablet, Rfl: 3    valsartan-hydrochlorothiazide (DIOVAN-HCT) 160-25 MG per tablet, Take 1 tablet by mouth Daily., Disp: 90 tablet, Rfl: 3    Allergies: Penicillins and Sulfa antibiotics    Physical Exam  Vitals reviewed.   Constitutional:       Appearance: Normal appearance.   HENT:      Head: Normocephalic and atraumatic.      Right Ear: Tympanic membrane, ear canal and external ear normal.      Left Ear: Tympanic membrane, ear canal and external ear normal.      Nose: Nose normal.      Mouth/Throat:      Mouth: Mucous membranes are moist.   Eyes:      Extraocular Movements: Extraocular movements intact.      Pupils: Pupils are equal, round, and reactive to light.   Cardiovascular:      Rate and Rhythm: Normal rate and regular rhythm.      Pulses: Normal pulses.      Heart sounds: Normal heart sounds.   Pulmonary:      Effort: Pulmonary effort is normal.      Breath sounds: Normal breath sounds.   Abdominal:      General: Abdomen is flat. Bowel sounds are normal.      Palpations: Abdomen is soft.   Musculoskeletal:         General: Normal range of motion.      Cervical back: Normal range of  motion and neck supple.   Skin:     General: Skin is warm and dry.   Neurological:      General: No focal deficit present.      Mental Status: She is alert and oriented to person, place, and time.   Psychiatric:         Mood and Affect: Mood normal.         Behavior: Behavior normal.          Result Review :            ECG 12 Lead    Date/Time: 2/8/2024 8:49 AM  Performed by: Janelle Sage PA-C    Authorized by: Janelle Sage PA-C  Comparison: not compared with previous ECG   Previous ECG: no previous ECG available  Rhythm: sinus rhythm  Rate: normal  BPM: 80  Conduction: conduction normal  ST Segments: ST segments normal  QRS axis: normal  Other: no other findings    Clinical impression: normal ECG              Assessment and Plan    Diagnoses and all orders for this visit:    1. Preop examination (Primary)  Assessment & Plan:  EKG is normal, and she [facility].  I see no contraindication for proceeding with the planned procedure. Routine pre and post op orders are fine.     Orders:  -     ECG 12 Lead        Follow Up   No follow-ups on file.  Patient was given instructions and counseling regarding her condition or for health maintenance advice. Please see specific information pulled into the AVS if appropriate.     Janelle Sage PA-C

## 2024-02-08 NOTE — ASSESSMENT & PLAN NOTE
EKG is normal, and she is getting her pre-op labs done at [facility].  I see no contraindication for proceeding with the planned procedure. Routine pre and post op orders are fine.

## 2024-02-26 RX ORDER — VALSARTAN AND HYDROCHLOROTHIAZIDE 160; 25 MG/1; MG/1
1 TABLET ORAL DAILY
Qty: 90 TABLET | Refills: 3 | Status: SHIPPED | OUTPATIENT
Start: 2024-02-26

## 2024-03-13 RX ORDER — OMEPRAZOLE 20 MG/1
20 CAPSULE, DELAYED RELEASE ORAL EVERY MORNING
Qty: 90 CAPSULE | Refills: 3 | Status: SHIPPED | OUTPATIENT
Start: 2024-03-13

## 2024-03-20 RX ORDER — DILTIAZEM HYDROCHLORIDE 180 MG/1
180 CAPSULE, EXTENDED RELEASE ORAL DAILY
Qty: 90 CAPSULE | Refills: 1 | Status: SHIPPED | OUTPATIENT
Start: 2024-03-20

## 2024-04-16 RX ORDER — ROSUVASTATIN CALCIUM 5 MG/1
5 TABLET, COATED ORAL DAILY
Qty: 90 TABLET | Refills: 3 | Status: SHIPPED | OUTPATIENT
Start: 2024-04-16

## 2024-05-01 ENCOUNTER — TRANSCRIBE ORDERS (OUTPATIENT)
Dept: LAB | Facility: HOSPITAL | Age: 69
End: 2024-05-01
Payer: MEDICARE

## 2024-05-01 ENCOUNTER — LAB (OUTPATIENT)
Dept: LAB | Facility: HOSPITAL | Age: 69
End: 2024-05-01
Payer: MEDICARE

## 2024-05-01 DIAGNOSIS — K80.10 CALCULUS OF GALLBLADDER WITH CHOLECYSTITIS WITHOUT BILIARY OBSTRUCTION, UNSPECIFIED CHOLECYSTITIS ACUITY: ICD-10-CM

## 2024-05-01 DIAGNOSIS — K80.10 CALCULUS OF GALLBLADDER WITH CHOLECYSTITIS WITHOUT BILIARY OBSTRUCTION, UNSPECIFIED CHOLECYSTITIS ACUITY: Primary | ICD-10-CM

## 2024-05-01 LAB
ALBUMIN SERPL-MCNC: 4.3 G/DL (ref 3.5–5.2)
ALBUMIN/GLOB SERPL: 1.8 G/DL
ALP SERPL-CCNC: 94 U/L (ref 39–117)
ALT SERPL W P-5'-P-CCNC: 19 U/L (ref 1–33)
ANION GAP SERPL CALCULATED.3IONS-SCNC: 8.7 MMOL/L (ref 5–15)
AST SERPL-CCNC: 22 U/L (ref 1–32)
BASOPHILS # BLD AUTO: 0.04 10*3/MM3 (ref 0–0.2)
BASOPHILS NFR BLD AUTO: 0.6 % (ref 0–1.5)
BILIRUB SERPL-MCNC: 1 MG/DL (ref 0–1.2)
BUN SERPL-MCNC: 21 MG/DL (ref 8–23)
BUN/CREAT SERPL: 27.3 (ref 7–25)
CALCIUM SPEC-SCNC: 9.6 MG/DL (ref 8.6–10.5)
CHLORIDE SERPL-SCNC: 103 MMOL/L (ref 98–107)
CO2 SERPL-SCNC: 27.3 MMOL/L (ref 22–29)
CREAT SERPL-MCNC: 0.77 MG/DL (ref 0.57–1)
DEPRECATED RDW RBC AUTO: 43 FL (ref 37–54)
EGFRCR SERPLBLD CKD-EPI 2021: 84.7 ML/MIN/1.73
EOSINOPHIL # BLD AUTO: 0.14 10*3/MM3 (ref 0–0.4)
EOSINOPHIL NFR BLD AUTO: 2.2 % (ref 0.3–6.2)
ERYTHROCYTE [DISTWIDTH] IN BLOOD BY AUTOMATED COUNT: 12.5 % (ref 12.3–15.4)
GLOBULIN UR ELPH-MCNC: 2.4 GM/DL
GLUCOSE SERPL-MCNC: 106 MG/DL (ref 65–99)
HCT VFR BLD AUTO: 38.7 % (ref 34–46.6)
HGB BLD-MCNC: 13.4 G/DL (ref 12–15.9)
IMM GRANULOCYTES # BLD AUTO: 0.01 10*3/MM3 (ref 0–0.05)
IMM GRANULOCYTES NFR BLD AUTO: 0.2 % (ref 0–0.5)
LYMPHOCYTES # BLD AUTO: 2.23 10*3/MM3 (ref 0.7–3.1)
LYMPHOCYTES NFR BLD AUTO: 35.7 % (ref 19.6–45.3)
MCH RBC QN AUTO: 32.3 PG (ref 26.6–33)
MCHC RBC AUTO-ENTMCNC: 34.6 G/DL (ref 31.5–35.7)
MCV RBC AUTO: 93.3 FL (ref 79–97)
MONOCYTES # BLD AUTO: 0.82 10*3/MM3 (ref 0.1–0.9)
MONOCYTES NFR BLD AUTO: 13.1 % (ref 5–12)
NEUTROPHILS NFR BLD AUTO: 3 10*3/MM3 (ref 1.7–7)
NEUTROPHILS NFR BLD AUTO: 48.2 % (ref 42.7–76)
NRBC BLD AUTO-RTO: 0 /100 WBC (ref 0–0.2)
PLATELET # BLD AUTO: 333 10*3/MM3 (ref 140–450)
PMV BLD AUTO: 10.7 FL (ref 6–12)
POTASSIUM SERPL-SCNC: 3.3 MMOL/L (ref 3.5–5.2)
PROT SERPL-MCNC: 6.7 G/DL (ref 6–8.5)
RBC # BLD AUTO: 4.15 10*6/MM3 (ref 3.77–5.28)
SODIUM SERPL-SCNC: 139 MMOL/L (ref 136–145)
WBC NRBC COR # BLD AUTO: 6.24 10*3/MM3 (ref 3.4–10.8)

## 2024-05-01 PROCEDURE — 85025 COMPLETE CBC W/AUTO DIFF WBC: CPT

## 2024-05-01 PROCEDURE — 80053 COMPREHEN METABOLIC PANEL: CPT

## 2024-05-01 PROCEDURE — 36415 COLL VENOUS BLD VENIPUNCTURE: CPT

## 2024-05-08 PROCEDURE — 88304 TISSUE EXAM BY PATHOLOGIST: CPT | Performed by: SURGERY

## 2024-05-09 ENCOUNTER — LAB REQUISITION (OUTPATIENT)
Dept: LAB | Facility: HOSPITAL | Age: 69
End: 2024-05-09
Payer: MEDICARE

## 2024-05-09 DIAGNOSIS — K80.20 CALCULUS OF GALLBLADDER WITHOUT CHOLECYSTITIS WITHOUT OBSTRUCTION: ICD-10-CM

## 2024-05-10 LAB
CYTO UR: NORMAL
LAB AP CASE REPORT: NORMAL
LAB AP CLINICAL INFORMATION: NORMAL
PATH REPORT.FINAL DX SPEC: NORMAL
PATH REPORT.GROSS SPEC: NORMAL

## 2024-09-19 RX ORDER — DILTIAZEM HYDROCHLORIDE 180 MG/1
180 CAPSULE, EXTENDED RELEASE ORAL DAILY
Qty: 90 CAPSULE | Refills: 1 | Status: SHIPPED | OUTPATIENT
Start: 2024-09-19

## 2024-09-25 ENCOUNTER — OFFICE VISIT (OUTPATIENT)
Dept: FAMILY MEDICINE CLINIC | Facility: CLINIC | Age: 69
End: 2024-09-25
Payer: MEDICARE

## 2024-09-25 ENCOUNTER — TELEPHONE (OUTPATIENT)
Dept: CARDIOLOGY | Facility: CLINIC | Age: 69
End: 2024-09-25
Payer: MEDICARE

## 2024-09-25 VITALS
SYSTOLIC BLOOD PRESSURE: 134 MMHG | DIASTOLIC BLOOD PRESSURE: 78 MMHG | BODY MASS INDEX: 21.96 KG/M2 | WEIGHT: 131.8 LBS | HEART RATE: 93 BPM | HEIGHT: 65 IN | OXYGEN SATURATION: 99 %

## 2024-09-25 DIAGNOSIS — R00.0 TACHYCARDIA: ICD-10-CM

## 2024-09-25 DIAGNOSIS — G47.8 NON-RESTORATIVE SLEEP: ICD-10-CM

## 2024-09-25 DIAGNOSIS — R10.11 RIGHT UPPER QUADRANT ABDOMINAL PAIN: Primary | ICD-10-CM

## 2024-09-25 DIAGNOSIS — E87.6 HYPOKALEMIA: ICD-10-CM

## 2024-09-25 PROCEDURE — 99214 OFFICE O/P EST MOD 30 MIN: CPT | Performed by: FAMILY MEDICINE

## 2024-09-25 PROCEDURE — 3078F DIAST BP <80 MM HG: CPT | Performed by: FAMILY MEDICINE

## 2024-09-25 PROCEDURE — 3075F SYST BP GE 130 - 139MM HG: CPT | Performed by: FAMILY MEDICINE

## 2024-09-25 NOTE — PROGRESS NOTES
Date: 2024   Patient Name: Lorena Zavala  : 1955   MRN: [id number]     Chief Complaint:    Chief Complaint   Patient presents with    Results     Discuss lab results     Palpitations     Patient states she is waking up with heart palpitations, sleep pattern is off     Abdominal Pain     Right sided abdominal pain        History of Present Illness  The patient presents for evaluation of multiple medical concerns.    She has undergone two surgeries, including a cholecystectomy on 2024. She experiences pain in the right upper quadrant that radiates to her back. This led her to consult with Dr. Pleitez, a gastroenterologist, who ordered a CT scan, HIDA scan, and gallbladder ultrasound. These tests revealed gallstones, which were subsequently removed. Despite the successful surgery and recovery, she continues to experience back pain. The intensity of the pain varies, and she occasionally takes pain medication. She describes the pain as a feeling of swelling, distinct from bloating. She also reports slow bowel movements but does not experience constipation. She took MiraLAX for a period of time. She was not dehydrated during her gallbladder surgery and did not fast prior to it.    She is interested in discussing her recent lab results, which showed a high monocyte percentage.    She has been experiencing episodes of waking up with a racing heart since last year. A sleep study was conducted to investigate potential sleep apnea. She began using a device to monitor her heart rhythm, which showed sinus rhythm. She believes her sleep quality has declined, affecting her memory. She consulted a cardiologist when she first noticed the racing heart. She recently had COVID-19 but has since recovered. She continues to notice an elevated heart rate almost daily, which she suspects may be related to her sleep. Her blood pressure was recorded as 128/68 during one of these episodes. She does not experience  chest pain or other heart-related symptoms. She often feels exhausted after work and experienced shortness of breath while walking uphill this summer.    She was put on omeprazole for acid reflux and heartburn 2 or 3 years ago. She took it until 2023 or 2023.           Review of Systems:   Review of Systems   Constitutional:  Negative for fever.   Respiratory:  Negative for cough and shortness of breath.    Cardiovascular:  Negative for chest pain.   Gastrointestinal:  Positive for abdominal pain.   Neurological:  Negative for dizziness and headache.   Psychiatric/Behavioral:  Negative for depressed mood. The patient is not nervous/anxious.        Past Medical History:   Past Medical History:   Diagnosis Date    Sanders syndrome     Cholelithiasis     Removed 24    Diverticulosis     GERD (gastroesophageal reflux disease)     Hyperlipidemia     Hypertension        Past Surgical History:   Past Surgical History:   Procedure Laterality Date    APPENDECTOMY      BUNIONECTOMY       SECTION      CHOLECYSTECTOMY  26    COLONOSCOPY      EAR TUBES      MOLE REMOVAL      TONSILLECTOMY         Family History:   Family History   Problem Relation Age of Onset    Stroke Mother 80    Arthritis Mother     Hyperlipidemia Mother     Heart attack Father 55    Heart disease Father     Hyperlipidemia Father     Stroke Maternal Grandmother     Heart attack Maternal Grandfather     Diabetes Paternal Grandmother     Stroke Paternal Grandfather        Social History:   Social History     Socioeconomic History    Marital status:      Spouse name: Ed    Number of children: 1   Tobacco Use    Smoking status: Never     Passive exposure: Never    Smokeless tobacco: Never   Vaping Use    Vaping status: Never Used   Substance and Sexual Activity    Alcohol use: Yes     Alcohol/week: 2.0 standard drinks of alcohol     Types: 1 Glasses of wine, 1 Cans of beer per week    Drug use: Never    Sexual activity: Yes  "    Partners: Male     Birth control/protection: Post-menopausal       Medications:     Current Outpatient Medications:     Cholecalciferol (Vitamin D3) 50 MCG (2000 UT) tablet, Take  by mouth., Disp: , Rfl:     dilTIAZem XR (DILACOR XR) 180 MG 24 hr capsule, Take 1 capsule by mouth Daily., Disp: 90 capsule, Rfl: 1    omeprazole (priLOSEC) 20 MG capsule, Take 1 capsule by mouth Every Morning., Disp: 90 capsule, Rfl: 3    rosuvastatin (CRESTOR) 5 MG tablet, Take 1 tablet by mouth Daily., Disp: 90 tablet, Rfl: 3    valsartan-hydrochlorothiazide (DIOVAN-HCT) 160-25 MG per tablet, TAKE 1 TABLET BY MOUTH DAILY., Disp: 90 tablet, Rfl: 3    Allergies:   Allergies   Allergen Reactions    Penicillins Unknown - Low Severity    Sulfa Antibiotics Other (See Comments)       PHQ-2 Total Score:     PHQ-9 Total Score:       Physical Exam:  Vital Signs:   Vitals:    09/25/24 0859   BP: 134/78   BP Location: Left arm   Patient Position: Sitting   Cuff Size: Adult   Pulse: 93   SpO2: 99%   Weight: 59.8 kg (131 lb 12.8 oz)   Height: 163.8 cm (64.5\")     Body mass index is 22.27 kg/m².     Physical Exam  Vitals and nursing note reviewed.   Constitutional:       Appearance: Normal appearance.   Cardiovascular:      Rate and Rhythm: Normal rate and regular rhythm.      Heart sounds: Normal heart sounds.   Pulmonary:      Effort: Pulmonary effort is normal.      Breath sounds: Normal breath sounds.   Abdominal:      General: Bowel sounds are normal.      Palpations: Abdomen is soft.   Neurological:      Mental Status: She is alert and oriented to person, place, and time. Mental status is at baseline.   Psychiatric:         Mood and Affect: Mood normal.         Behavior: Behavior normal.           Assessment/Plan:   Diagnoses and all orders for this visit:    1. Right upper quadrant abdominal pain (Primary)  -     XR Abdomen KUB; Future    2. Hypokalemia  -     CBC Auto Differential; Future  -     Comprehensive Metabolic Panel; Future  -    "  Comprehensive Metabolic Panel  -     CBC Auto Differential    3. Tachycardia  -     Ambulatory Referral to Sleep Medicine    4. Non-restorative sleep  -     Ambulatory Referral to Sleep Medicine    Other orders  -     CBC & Differential           Follow Up:   Return for has appt.        Bonita Vaz DO  [facility]

## 2024-09-26 LAB
ALBUMIN SERPL-MCNC: 4.8 G/DL (ref 3.9–4.9)
ALP SERPL-CCNC: 99 IU/L (ref 44–121)
ALT SERPL-CCNC: 27 IU/L (ref 0–32)
AST SERPL-CCNC: 21 IU/L (ref 0–40)
BASOPHILS # BLD AUTO: 0 X10E3/UL (ref 0–0.2)
BASOPHILS NFR BLD AUTO: 1 %
BILIRUB SERPL-MCNC: 1 MG/DL (ref 0–1.2)
BUN SERPL-MCNC: 19 MG/DL (ref 8–27)
BUN/CREAT SERPL: 25 (ref 12–28)
CALCIUM SERPL-MCNC: 9.6 MG/DL (ref 8.7–10.3)
CHLORIDE SERPL-SCNC: 99 MMOL/L (ref 96–106)
CO2 SERPL-SCNC: 25 MMOL/L (ref 20–29)
CREAT SERPL-MCNC: 0.77 MG/DL (ref 0.57–1)
EGFRCR SERPLBLD CKD-EPI 2021: 84 ML/MIN/1.73
EOSINOPHIL # BLD AUTO: 0.2 X10E3/UL (ref 0–0.4)
EOSINOPHIL NFR BLD AUTO: 3 %
ERYTHROCYTE [DISTWIDTH] IN BLOOD BY AUTOMATED COUNT: 12.6 % (ref 11.7–15.4)
GLOBULIN SER CALC-MCNC: 2.5 G/DL (ref 1.5–4.5)
GLUCOSE SERPL-MCNC: 111 MG/DL (ref 70–99)
HCT VFR BLD AUTO: 45.5 % (ref 34–46.6)
HGB BLD-MCNC: 15 G/DL (ref 11.1–15.9)
IMM GRANULOCYTES # BLD AUTO: 0 X10E3/UL (ref 0–0.1)
IMM GRANULOCYTES NFR BLD AUTO: 0 %
LYMPHOCYTES # BLD AUTO: 1.4 X10E3/UL (ref 0.7–3.1)
LYMPHOCYTES NFR BLD AUTO: 26 %
MCH RBC QN AUTO: 31.7 PG (ref 26.6–33)
MCHC RBC AUTO-ENTMCNC: 33 G/DL (ref 31.5–35.7)
MCV RBC AUTO: 96 FL (ref 79–97)
MONOCYTES # BLD AUTO: 0.7 X10E3/UL (ref 0.1–0.9)
MONOCYTES NFR BLD AUTO: 12 %
NEUTROPHILS # BLD AUTO: 3.2 X10E3/UL (ref 1.4–7)
NEUTROPHILS NFR BLD AUTO: 58 %
PLATELET # BLD AUTO: 373 X10E3/UL (ref 150–450)
POTASSIUM SERPL-SCNC: 3.5 MMOL/L (ref 3.5–5.2)
PROT SERPL-MCNC: 7.3 G/DL (ref 6–8.5)
RBC # BLD AUTO: 4.73 X10E6/UL (ref 3.77–5.28)
SODIUM SERPL-SCNC: 139 MMOL/L (ref 134–144)
WBC # BLD AUTO: 5.5 X10E3/UL (ref 3.4–10.8)

## 2024-09-27 ENCOUNTER — OFFICE VISIT (OUTPATIENT)
Age: 69
End: 2024-09-27
Payer: MEDICARE

## 2024-09-27 VITALS
HEIGHT: 65 IN | BODY MASS INDEX: 22.33 KG/M2 | SYSTOLIC BLOOD PRESSURE: 130 MMHG | DIASTOLIC BLOOD PRESSURE: 70 MMHG | WEIGHT: 134 LBS | OXYGEN SATURATION: 97 % | HEART RATE: 95 BPM

## 2024-09-27 DIAGNOSIS — E78.2 MIXED HYPERLIPIDEMIA: ICD-10-CM

## 2024-09-27 DIAGNOSIS — G89.29 CHRONIC CHEST PAIN WITH LOW TO MODERATE RISK FOR CAD: ICD-10-CM

## 2024-09-27 DIAGNOSIS — Z82.49 FAMILY HISTORY OF EARLY CAD: ICD-10-CM

## 2024-09-27 DIAGNOSIS — Z86.16 HISTORY OF COVID-19: Primary | ICD-10-CM

## 2024-09-27 DIAGNOSIS — R07.9 CHRONIC CHEST PAIN WITH LOW TO MODERATE RISK FOR CAD: ICD-10-CM

## 2024-09-27 DIAGNOSIS — Z91.89 CHRONIC CHEST PAIN WITH LOW TO MODERATE RISK FOR CAD: ICD-10-CM

## 2024-09-27 DIAGNOSIS — R06.02 SOB (SHORTNESS OF BREATH): ICD-10-CM

## 2024-09-27 DIAGNOSIS — G47.10 HYPERSOMNIA: ICD-10-CM

## 2024-09-27 DIAGNOSIS — R00.0 TACHYCARDIA: ICD-10-CM

## 2024-09-27 PROBLEM — M81.0 AGE-RELATED OSTEOPOROSIS WITHOUT CURRENT PATHOLOGICAL FRACTURE: Status: ACTIVE | Noted: 2024-09-27

## 2024-09-27 PROBLEM — I10 HYPERTENSION: Status: ACTIVE | Noted: 2024-09-27

## 2024-09-27 PROBLEM — E55.9 VITAMIN D DEFICIENCY: Status: ACTIVE | Noted: 2024-09-27

## 2024-09-27 PROCEDURE — 3078F DIAST BP <80 MM HG: CPT | Performed by: NURSE PRACTITIONER

## 2024-09-27 PROCEDURE — 3075F SYST BP GE 130 - 139MM HG: CPT | Performed by: NURSE PRACTITIONER

## 2024-09-27 PROCEDURE — 99204 OFFICE O/P NEW MOD 45 MIN: CPT | Performed by: NURSE PRACTITIONER

## 2024-09-27 NOTE — PROGRESS NOTES
[facility] Provider Note     Date:   2024   Name: Lorena Zavala  :   1955  PCP: Bonita Vaz DO    Chief Complaint   Patient presents with    Establish Care     Sleep Eval  Neck 12       Subjective     History of Present Illness  Lorena Zavala is a 69 y.o. female who presents today for to establish care for excessive daytime sleepiness, feeling groggy, and having a racing heart at night.  She also admits to having TREJO when watering her plants, tachycardia with minimal movement during the day, tachycardia at night that wakes her up.  She sometimes has chest pressure with tachycardia episodes.  She and her  own and run the local [employer] and Her heart rate can get up to 114-120 just by getting up from her desk at work.  At night she wakes up with her heart pounding and she checks her apple watch and her heart rate will be in the 120's but the EKG portion says NSR.    She saw cardiology in the past but reports it was several years ago.  She thinks her symptoms starting progressively getting worse after having Covid in 2020.  She is also concerned because her father  from a massive MI at age 50.  Her mother had a stroke but at the age of 80.    In the past she had a holter, stress test, and a trial of metoprolol for sinus tachycardia.  She felt like the beta blocker gave her chest pressure and took her breath away.  No known history of asthma. She is on diltiazem but still having daily symptoms.    She has coexisitng HTN and is on three medications.  As far as she knows she has never had a renal ultrasound.  She does not want one at this time.    She really isn't experiencing a lot of excessive daytime sleepiness, but nevertheless do not drive tired.  Orange score of 3.  She does have excessive daytime sleepiness.  She spends most of the night sleeping on her back.  She has woken up with a dry [place] and has awakened gasping for breath.  She  does have morning headaches sometimes.  No symptoms of RLS or PLMS or parasomnias.  She does have difficulty staying asleep at night due to the tachycardia which will sometimes lead to frequent nightmares.  She works 9-5.  She goes to bed around 10:30 PM and gets up at 5 or 6.  It takes her about 10 minutes to go to sleep she wakes up 3 times a night.  She does have coexisting hypertension acid reflux, and hyperlipidemia.  She has a past surgical history of appendectomy, Cholly, tonsillectomy, bunion. Two c-sections.  She was referred to sleep specialist in the past but was found to have a left ear Cholesteatoma so she never had a sleep study.      EKG 2/2024 NSR.    It is unclear if the tachycardia is causing the sleep issues or if she has GABRIELA that is causing the tachycardia, or both.      TREJO  -May  be undiagnosed asthma/pulmonary issues as BB worsened it.  Will refer back to PCP.    -May be related to tachycardia. Better controlling tachycardia may help or resolve it.    -Update EKG, holter, echo, and stress test    -Room to increase Diltiazem after testing    Tachycardia/Chest discomfort/Palpitations  -Appears to be sinus Tachycardia, though irregular heart beat heard today    -R/O GABRIELA    -Update EKG, holter, echo, and stress test    -Room to increase Diltiazem after testing    -I suspect POTS      1.  Tachycardia  2.  Palpitations  3.  Family history of premature CAD #4 history of COVID  4.  Resistant hypertension  5.  Hyperlipidemia  6.  Hypersomnia    Unable to tolerate metoprolol related to chest pressure.  Has not tried other beta-blockers.  Has done okay as far as side effects with diltiazem but current dose is not controlling palpitations or tachycardia.    Allergies   Allergen Reactions    Penicillins Unknown - Low Severity    Sulfa Antibiotics Other (See Comments)       Current Outpatient Medications:     Cholecalciferol (Vitamin D3) 50 MCG (2000 UT) tablet, Take  by mouth., Disp: , Rfl:     dilTIAZem  "XR (DILACOR XR) 180 MG 24 hr capsule, Take 1 capsule by mouth Daily., Disp: 90 capsule, Rfl: 1    omeprazole (priLOSEC) 20 MG capsule, Take 1 capsule by mouth Every Morning., Disp: 90 capsule, Rfl: 3    rosuvastatin (CRESTOR) 5 MG tablet, Take 1 tablet by mouth Daily., Disp: 90 tablet, Rfl: 3    valsartan-hydrochlorothiazide (DIOVAN-HCT) 160-25 MG per tablet, TAKE 1 TABLET BY MOUTH DAILY., Disp: 90 tablet, Rfl: 3    Past Medical History:   Diagnosis Date    Sanders syndrome     Cholelithiasis     Removed 24    Diverticulosis     GERD (gastroesophageal reflux disease)     Hyperlipidemia     Hypertension       Past Surgical History:   Procedure Laterality Date    APPENDECTOMY      BUNIONECTOMY       SECTION      CHOLECYSTECTOMY  24    COLONOSCOPY      EAR TUBES      MOLE REMOVAL      TONSILLECTOMY       Family History   Problem Relation Age of Onset    Stroke Mother 80    Arthritis Mother     Hyperlipidemia Mother     Heart attack Father 55    Heart disease Father     Hyperlipidemia Father     Stroke Maternal Grandmother     Heart attack Maternal Grandfather     Diabetes Paternal Grandmother     Stroke Paternal Grandfather      Social History     Socioeconomic History    Marital status:      Spouse name: Ed    Number of children: 1   Tobacco Use    Smoking status: Never     Passive exposure: Never    Smokeless tobacco: Never   Vaping Use    Vaping status: Never Used   Substance and Sexual Activity    Alcohol use: Yes     Alcohol/week: 2.0 standard drinks of alcohol     Types: 1 Glasses of wine, 1 Cans of beer per week    Drug use: Never    Sexual activity: Yes     Partners: Male     Birth control/protection: Post-menopausal       Objective     Vital Signs:  /70 (BP Location: Right arm, Patient Position: Sitting, Cuff Size: Adult)   Pulse 95   Ht 163.8 cm (64.5\")   Wt 60.8 kg (134 lb)   SpO2 97%   BMI 22.65 kg/m²   Estimated body mass index is 22.65 kg/m² as calculated from the " "following:    Height as of this encounter: 163.8 cm (64.5\").    Weight as of this encounter: 60.8 kg (134 lb).         Physical Exam  Vitals reviewed.   Constitutional:       Appearance: Normal appearance. She is well-developed.   HENT:      Head: Normocephalic and atraumatic.   Eyes:      General: No scleral icterus.     Pupils: Pupils are equal, round, and reactive to light.   Cardiovascular:      Rate and Rhythm: Regular rhythm. Tachycardia present.      Heart sounds: Normal heart sounds. No murmur heard.  Pulmonary:      Effort: Pulmonary effort is normal.      Breath sounds: Normal breath sounds. No wheezing or rhonchi.   Musculoskeletal:         General: Normal range of motion.      Right lower leg: No edema.      Left lower leg: No edema.   Skin:     General: Skin is warm and dry.      Capillary Refill: Capillary refill takes less than 2 seconds.      Coloration: Skin is not cyanotic.      Nails: There is no clubbing.   Neurological:      Mental Status: She is alert and oriented to person, place, and time.      Motor: No weakness.      Gait: Gait normal.   Psychiatric:         Mood and Affect: Mood normal.         Behavior: Behavior is cooperative.         Thought Content: Thought content normal.         Cognition and Memory: Memory normal.                 ECG 12 Lead    Date/Time: 10/1/2024 12:14 PM  Performed by: Nery Flaherty APRN    Authorized by: Nery Flaherty APRN  Comparison: compared with previous ECG from 2/8/2024  Similar to previous ECG  Rhythm: sinus rhythm  Rate: normal  BPM: 80  QRS axis: normal    Clinical impression: normal ECG           Assessment and Plan     Diagnoses and all orders for this visit:    1. History of COVID-19 (Primary)  -     Adult Transthoracic Echo Complete W/ Cont if Necessary Per Protocol; Future  -     Holter Monitor - 72 Hour Up To 15 Days  -     Adult Stress Echo W/ Cont or Stress Agent if Necessary Per Protocol; Future  -     Home Sleep Study; " Future  -     ECG 12 Lead    2. Tachycardia  -     Adult Transthoracic Echo Complete W/ Cont if Necessary Per Protocol; Future  -     Holter Monitor - 72 Hour Up To 15 Days  -     Adult Stress Echo W/ Cont or Stress Agent if Necessary Per Protocol; Future  -     Home Sleep Study; Future  -     ECG 12 Lead    3. Hypersomnia  -     Adult Transthoracic Echo Complete W/ Cont if Necessary Per Protocol; Future  -     Holter Monitor - 72 Hour Up To 15 Days  -     Adult Stress Echo W/ Cont or Stress Agent if Necessary Per Protocol; Future  -     Home Sleep Study; Future  -     ECG 12 Lead    4. Chronic chest pain with low to moderate risk for CAD  -     Adult Transthoracic Echo Complete W/ Cont if Necessary Per Protocol; Future  -     Holter Monitor - 72 Hour Up To 15 Days  -     Adult Stress Echo W/ Cont or Stress Agent if Necessary Per Protocol; Future  -     Home Sleep Study; Future  -     ECG 12 Lead    5. SOB (shortness of breath)  -     Adult Transthoracic Echo Complete W/ Cont if Necessary Per Protocol; Future  -     Holter Monitor - 72 Hour Up To 15 Days  -     Adult Stress Echo W/ Cont or Stress Agent if Necessary Per Protocol; Future  -     Home Sleep Study; Future  -     ECG 12 Lead    6. Mixed hyperlipidemia  Assessment & Plan:  On statin      7. Family history of early CAD  Assessment & Plan:  Risk factor for CAD; update cardiac testing          Recommendations: ER if symptoms increase, Report if any new/changing symptoms immediately, Limitations of stress testing for definitive diagnosis reviewed, Sleep risks reviewed (driving, medical, sleep death, sedating agents), and Sleep hygiene discussed      Follow Up  Return in about 4 weeks (around 10/25/2024) for after testing.    Nery Flaherty, APRCÉSAR   09/27/2024     Please note that this explicitly excludes time spent on other separate billable services such as performing procedures or test interpretation, when applicable.    This note was created using  dictation software which occasionally transcribes nonsensical phrases. Please contact the provider if any clarification is needed.

## 2024-09-27 NOTE — Clinical Note
Thank you for the referral! She is so sweet!  I think her issues are more sinus tachy or POTS than sleep.  I am working her up for all three.  Thanks again!

## 2024-10-01 ENCOUNTER — PATIENT ROUNDING (BHMG ONLY) (OUTPATIENT)
Dept: CARDIOLOGY | Facility: CLINIC | Age: 69
End: 2024-10-01
Payer: MEDICARE

## 2024-10-01 PROCEDURE — 93000 ELECTROCARDIOGRAM COMPLETE: CPT | Performed by: NURSE PRACTITIONER

## 2024-10-01 NOTE — PROGRESS NOTES
..My name is Kyung Bailey and I am the Practice Manager for [facility] [place].    I would like to thank you for being a loyal patient. If you do not mind I would like to ask you a few questions about your recent visit with us.  Please feel free to reply if you wish to provide us with feedback on your first visit with our practice.    First, could you tell me what went well with your recent visit?    Secondly, we are always looking for ways to make our patients' experiences even better.  Do you have any recommendations on ways we may improve?    Finally, overall were you satisfied with your first visit to us as a [facility] facility?    In the next few days, you will be receiving a Patient Experience Survey.      Thank you for taking the time to answer a few questions today.  I hope you have a good day.

## 2024-10-15 ENCOUNTER — TELEPHONE (OUTPATIENT)
Dept: CARDIOLOGY | Facility: CLINIC | Age: 69
End: 2024-10-15
Payer: MEDICARE

## 2024-10-15 DIAGNOSIS — R00.0 TACHYCARDIA: ICD-10-CM

## 2024-10-15 DIAGNOSIS — R07.9 CHRONIC CHEST PAIN WITH LOW TO MODERATE RISK FOR CAD: ICD-10-CM

## 2024-10-15 DIAGNOSIS — R06.02 SOB (SHORTNESS OF BREATH): ICD-10-CM

## 2024-10-15 DIAGNOSIS — Z91.89 CHRONIC CHEST PAIN WITH LOW TO MODERATE RISK FOR CAD: ICD-10-CM

## 2024-10-15 DIAGNOSIS — G47.10 HYPERSOMNIA: ICD-10-CM

## 2024-10-15 DIAGNOSIS — G89.29 CHRONIC CHEST PAIN WITH LOW TO MODERATE RISK FOR CAD: ICD-10-CM

## 2024-10-15 DIAGNOSIS — Z86.16 HISTORY OF COVID-19: ICD-10-CM

## 2024-10-15 NOTE — TELEPHONE ENCOUNTER
Please call patient and let her know that the home sleep study showed that she has mild sleep apnea that becomes moderate and REM sleep.  Her overall AHI is 12.5, AHI in rem sleep is 21.9.  We recommend CPAP therapy.  Please find out if she is agreeable and I will send an order for PAP therapy to DME of choice.  She will need a 31-90-day compliance visit.

## 2024-10-15 NOTE — TELEPHONE ENCOUNTER
Patient notified. Patient verbalized understanding. Patient is not interested in PAP therapy at this time. Patient would like to try a dental device, but would like to talk to Rea before she makes a decision. Can you call patient and see if you can get her in sooner?

## 2024-10-18 ENCOUNTER — OFFICE VISIT (OUTPATIENT)
Age: 69
End: 2024-10-18
Payer: MEDICARE

## 2024-10-18 VITALS
OXYGEN SATURATION: 99 % | HEART RATE: 82 BPM | SYSTOLIC BLOOD PRESSURE: 130 MMHG | HEIGHT: 65 IN | DIASTOLIC BLOOD PRESSURE: 68 MMHG | BODY MASS INDEX: 21.99 KG/M2 | WEIGHT: 132 LBS

## 2024-10-18 DIAGNOSIS — G47.10 HYPERSOMNIA: ICD-10-CM

## 2024-10-18 DIAGNOSIS — R00.0 TACHYCARDIA: ICD-10-CM

## 2024-10-18 DIAGNOSIS — G47.33 OSA (OBSTRUCTIVE SLEEP APNEA): ICD-10-CM

## 2024-10-18 PROCEDURE — 99214 OFFICE O/P EST MOD 30 MIN: CPT | Performed by: NURSE PRACTITIONER

## 2024-10-18 PROCEDURE — 3078F DIAST BP <80 MM HG: CPT | Performed by: NURSE PRACTITIONER

## 2024-10-18 PROCEDURE — 3075F SYST BP GE 130 - 139MM HG: CPT | Performed by: NURSE PRACTITIONER

## 2024-10-21 NOTE — ASSESSMENT & PLAN NOTE
Holter, echo, and stress test pending.  GABRIELA-RX CPAP therapy.  Suspect coexisting POTS.  Intolerant to Metoprolol in past.

## 2024-10-21 NOTE — ASSESSMENT & PLAN NOTE
RX trial of CPAP - may improve POTS symptoms but unlikely to fully resolve the tachycardia and resulting fatigue.

## 2024-10-21 NOTE — PROGRESS NOTES
Cardiovascular and Sleep [facility]ing Provider Note     Date:   10/02/2024   Name: Lorena Zavala  :   1955  PCP: Bonita Vaz DO         History of Present Illness    Lorena Zavala is a 69 y.o. female who presents today for follow-up on tachycardia, palpitations, and sleep study.    He discussed recent sleep study and treatment options.  10/10/2024 HST- Mild GABRIELA becoming moderate in REM sleep.  Overall AHI 12.5, supine 12.5, 21.9 REM.  Data did not reveal evidence of arrhythmia.  She is willing to do a trial of CPAP therapy.    Holter, echo, and stress test are still pending.  She will need a follow-up on test results as well as a 31-90 day GABRIELA follow-up/  We will try to get them scheduled at the same time.      Presume POTS. Has failed Metoprolol in past.  For now focus on hydration and exercise pending cardiac testing.      Cardiology and Sleep Related History:    1.  Tachycardia  2.  Palpitations  3.  Family history of premature CAD #4 history of COVID  4.  Resistant hypertension  5.  Hyperlipidemia  6.  Hypersomnia  7.  POTS?  8.  GABRIELA    Declines renal ultrasound    Unable to tolerate metoprolol related to chest pressure.  Has not tried other beta-blockers.  Has done okay as far as side effects with diltiazem but current dose is not controlling palpitations or tachycardia.    10/10/2024 HST- Mild GABRIELA becoming moderate in REM sleep.  Overall AHI 12.5, supine 12.5, 21.9 REM.  Data did not reveal evidence of arrhythmia.                          Allergies   Allergen Reactions   • Penicillins Unknown - Low Severity   • Sulfa Antibiotics Other (See Comments)       Current Outpatient Medications:   •  Cholecalciferol (Vitamin D3) 50 MCG (2000) tablet, Take  by mouth., Disp: , Rfl:   •  dilTIAZem XR (DILACOR XR) 180 MG 24 hr capsule, Take 1 capsule by mouth Daily., Disp: 90 capsule, Rfl: 1  •  omeprazole (priLOSEC) 20 MG capsule, Take 1 capsule by mouth Every Morning., Disp: 90 capsule,  "Rfl: 3  •  rosuvastatin (CRESTOR) 5 MG tablet, Take 1 tablet by mouth Daily., Disp: 90 tablet, Rfl: 3  •  valsartan-hydrochlorothiazide (DIOVAN-HCT) 160-25 MG per tablet, TAKE 1 TABLET BY MOUTH DAILY., Disp: 90 tablet, Rfl: 3    PMH: Reviewed and updated in Epic    PSH:Reviewed and updated in Epic    FAMHX:Reviewed and updated in Epic    SOCHX:Reviewed and updated in Epic    Vital Signs:  /68 (BP Location: Right arm, Patient Position: Sitting, Cuff Size: Adult)   Pulse 82   Ht 163.8 cm (64.5\")   Wt 59.9 kg (132 lb)   SpO2 99%   BMI 22.31 kg/m²   Estimated body mass index is 22.31 kg/m² as calculated from the following:    Height as of this encounter: 163.8 cm (64.5\").    Weight as of this encounter: 59.9 kg (132 lb).     Physical Exam  Vitals reviewed.   Constitutional:       Appearance: Normal appearance. She is well-developed.   HENT:      Head: Normocephalic and atraumatic.   Eyes:      General: No scleral icterus.     Pupils: Pupils are equal, round, and reactive to light.   Cardiovascular:      Rate and Rhythm: Normal rate and regular rhythm.      Heart sounds: Normal heart sounds. No murmur heard.  Pulmonary:      Breath sounds: Normal breath sounds. No wheezing or rhonchi.   Musculoskeletal:         General: Normal range of motion.      Right lower leg: No edema.      Left lower leg: No edema.   Skin:     General: Skin is warm and dry.      Capillary Refill: Capillary refill takes less than 2 seconds.      Coloration: Skin is not cyanotic.      Nails: There is no clubbing.   Neurological:      Mental Status: She is alert and oriented to person, place, and time.      Motor: No weakness.      Gait: Gait normal.   Psychiatric:         Mood and Affect: Mood normal.         Behavior: Behavior is cooperative.         Thought Content: Thought content normal.         Cognition and Memory: Memory normal.           Assessment and Plan     Diagnoses and all orders for this visit:    1. GABRIELA (obstructive sleep " apnea)  Assessment & Plan:  RX trial of CPAP - may improve POTS symptoms but unlikely to fully resolve the tachycardia and resulting fatigue.    Orders:  -     PAP Therapy    2. Tachycardia  Assessment & Plan:  Holter, echo, and stress test pending.  GABRIELA-RX CPAP therapy.  Suspect coexisting POTS.  Intolerant to Metoprolol in past.      3. Hypersomnia  Assessment & Plan:  RX trial and CPAP therapy and complete pending cardiac testing.  Suspect GABRIELA and POTS.          Recommendations: ER if symptoms increase, Report if any new/changing symptoms immediately, Limitations of stress testing for definitive diagnosis reviewed, Exercise recommendations discussed, Sleep risks reviewed (driving, medical, sleep death, sedating agents), Sleep hygiene discussed, Increase pap therapy usage, and GABRIELA precautions for anesthesia    Follow Up  Return for 31-90 day GABRIELA and cardiac test results.    Nery Flaherty, APRN   10/02/2024     Please note that this explicitly excludes time spent on other separate billable services such as performing procedures or test interpretation, when applicable.  This note was created using dictation software which occasionally transcribes nonsensical phrases. Please contact the provider if any clarification is needed.

## 2024-10-30 ENCOUNTER — TELEPHONE (OUTPATIENT)
Dept: CARDIOLOGY | Facility: CLINIC | Age: 69
End: 2024-10-30
Payer: MEDICARE

## 2024-11-13 ENCOUNTER — HOSPITAL ENCOUNTER (OUTPATIENT)
Dept: CARDIOLOGY | Facility: HOSPITAL | Age: 69
Discharge: HOME | End: 2024-11-13
Admitting: NURSE PRACTITIONER
Payer: MEDICARE

## 2024-11-13 DIAGNOSIS — Z86.16 HISTORY OF COVID-19: ICD-10-CM

## 2024-11-13 DIAGNOSIS — R00.0 TACHYCARDIA: ICD-10-CM

## 2024-11-13 DIAGNOSIS — G89.29 CHRONIC CHEST PAIN WITH LOW TO MODERATE RISK FOR CAD: ICD-10-CM

## 2024-11-13 DIAGNOSIS — Z91.89 CHRONIC CHEST PAIN WITH LOW TO MODERATE RISK FOR CAD: ICD-10-CM

## 2024-11-13 DIAGNOSIS — R06.02 SOB (SHORTNESS OF BREATH): ICD-10-CM

## 2024-11-13 DIAGNOSIS — R07.9 CHRONIC CHEST PAIN WITH LOW TO MODERATE RISK FOR CAD: ICD-10-CM

## 2024-11-13 DIAGNOSIS — G47.10 HYPERSOMNIA: ICD-10-CM

## 2024-11-13 LAB
BH CV ECHO MEAS - EDV(CUBED): 68.9 ML
BH CV ECHO MEAS - EDV(MOD-SP2): 84.7 ML
BH CV ECHO MEAS - EDV(MOD-SP4): 83.3 ML
BH CV ECHO MEAS - EF(MOD-BP): 57.8 %
BH CV ECHO MEAS - EF(MOD-SP2): 64.5 %
BH CV ECHO MEAS - EF(MOD-SP4): 52.2 %
BH CV ECHO MEAS - ESV(CUBED): 15.6 ML
BH CV ECHO MEAS - ESV(MOD-SP2): 30.1 ML
BH CV ECHO MEAS - ESV(MOD-SP4): 39.8 ML
BH CV ECHO MEAS - FS: 39 %
BH CV ECHO MEAS - IVS/LVPW: 1.33 CM
BH CV ECHO MEAS - IVSD: 0.8 CM
BH CV ECHO MEAS - LA DIMENSION: 3.6 CM
BH CV ECHO MEAS - LV MASS(C)D: 81.7 GRAMS
BH CV ECHO MEAS - LVIDD: 4.1 CM
BH CV ECHO MEAS - LVIDS: 2.5 CM
BH CV ECHO MEAS - LVPWD: 0.6 CM
BH CV ECHO MEAS - MR MAX PG: 95.6 MMHG
BH CV ECHO MEAS - MR MAX VEL: 489 CM/SEC
BH CV ECHO MEAS - MR MEAN PG: 68 MMHG
BH CV ECHO MEAS - MR MEAN VEL: 394 CM/SEC
BH CV ECHO MEAS - MR VTI: 173 CM
BH CV ECHO MEAS - MV MAX PG: 6.5 MMHG
BH CV ECHO MEAS - MV MEAN PG: 4 MMHG
BH CV ECHO MEAS - MV V2 VTI: 31.2 CM
BH CV ECHO MEAS - SV(MOD-SP2): 54.6 ML
BH CV ECHO MEAS - SV(MOD-SP4): 43.5 ML
BH CV STRESS BP STAGE 1: NORMAL
BH CV STRESS BP STAGE 2: NORMAL
BH CV STRESS DURATION MIN STAGE 1: 3
BH CV STRESS DURATION MIN STAGE 2: 3
BH CV STRESS DURATION MIN STAGE 3: 0
BH CV STRESS DURATION SEC STAGE 1: 0
BH CV STRESS DURATION SEC STAGE 2: 0
BH CV STRESS DURATION SEC STAGE 3: 32
BH CV STRESS GRADE STAGE 1: 10
BH CV STRESS GRADE STAGE 2: 12
BH CV STRESS GRADE STAGE 3: 14
BH CV STRESS HR STAGE 1: 131
BH CV STRESS HR STAGE 2: 157
BH CV STRESS HR STAGE 3: 171
BH CV STRESS METS STAGE 1: 5
BH CV STRESS METS STAGE 2: 7.5
BH CV STRESS METS STAGE 3: 10
BH CV STRESS O2 STAGE 1: 99
BH CV STRESS O2 STAGE 2: 100
BH CV STRESS O2 STAGE 3: 99
BH CV STRESS PROTOCOL 1: NORMAL
BH CV STRESS RECOVERY BP: NORMAL MMHG
BH CV STRESS RECOVERY HR: 105 BPM
BH CV STRESS RECOVERY O2: 99 %
BH CV STRESS SPEED STAGE 1: 1.7
BH CV STRESS SPEED STAGE 2: 2.5
BH CV STRESS SPEED STAGE 3: 3.4
BH CV STRESS STAGE 1: 1
BH CV STRESS STAGE 2: 2
BH CV STRESS STAGE 3: 3
MAXIMAL PREDICTED HEART RATE: 153 BPM
PERCENT MAX PREDICTED HR: 111.76 %
STRESS BASELINE BP: NORMAL MMHG
STRESS BASELINE HR: 94 BPM
STRESS O2 SAT REST: 99 %
STRESS PERCENT HR: 131 %
STRESS POST ESTIMATED WORKLOAD: 7.8 METS
STRESS POST EXERCISE DUR MIN: 7 MIN
STRESS POST EXERCISE DUR SEC: 8 SEC
STRESS POST O2 SAT PEAK: 100 %
STRESS POST PEAK BP: NORMAL MMHG
STRESS POST PEAK HR: 171 BPM
STRESS TARGET HR: 130 BPM

## 2024-11-13 PROCEDURE — 93350 STRESS TTE ONLY: CPT

## 2024-11-13 PROCEDURE — 25010000002 SULFUR HEXAFLUORIDE MICROSPH 60.7-25 MG RECONSTITUTED SUSPENSION: Performed by: NURSE PRACTITIONER

## 2024-11-13 PROCEDURE — 93017 CV STRESS TEST TRACING ONLY: CPT

## 2024-11-13 RX ADMIN — SULFUR HEXAFLUORIDE 5 ML: KIT at 09:24

## 2024-11-22 ENCOUNTER — OFFICE VISIT (OUTPATIENT)
Dept: FAMILY MEDICINE CLINIC | Facility: CLINIC | Age: 69
End: 2024-11-22
Payer: MEDICARE

## 2024-11-22 ENCOUNTER — TELEPHONE (OUTPATIENT)
Dept: CARDIOLOGY | Facility: CLINIC | Age: 69
End: 2024-11-22
Payer: MEDICARE

## 2024-11-22 VITALS
WEIGHT: 131.3 LBS | SYSTOLIC BLOOD PRESSURE: 140 MMHG | HEIGHT: 65 IN | BODY MASS INDEX: 21.88 KG/M2 | HEART RATE: 90 BPM | DIASTOLIC BLOOD PRESSURE: 78 MMHG | OXYGEN SATURATION: 99 %

## 2024-11-22 DIAGNOSIS — E55.9 VITAMIN D DEFICIENCY: ICD-10-CM

## 2024-11-22 DIAGNOSIS — K21.9 GASTROESOPHAGEAL REFLUX DISEASE, UNSPECIFIED WHETHER ESOPHAGITIS PRESENT: ICD-10-CM

## 2024-11-22 DIAGNOSIS — Z00.00 MEDICARE ANNUAL WELLNESS VISIT, SUBSEQUENT: Primary | ICD-10-CM

## 2024-11-22 DIAGNOSIS — I10 PRIMARY HYPERTENSION: ICD-10-CM

## 2024-11-22 DIAGNOSIS — E78.2 MIXED HYPERLIPIDEMIA: ICD-10-CM

## 2024-11-22 DIAGNOSIS — Z13.29 SCREENING FOR THYROID DISORDER: ICD-10-CM

## 2024-11-22 PROCEDURE — G0439 PPPS, SUBSEQ VISIT: HCPCS | Performed by: FAMILY MEDICINE

## 2024-11-22 PROCEDURE — 1126F AMNT PAIN NOTED NONE PRSNT: CPT | Performed by: FAMILY MEDICINE

## 2024-11-22 PROCEDURE — 3078F DIAST BP <80 MM HG: CPT | Performed by: FAMILY MEDICINE

## 2024-11-22 PROCEDURE — 3077F SYST BP >= 140 MM HG: CPT | Performed by: FAMILY MEDICINE

## 2024-11-22 NOTE — TELEPHONE ENCOUNTER
----- Message from Nery Flaherty sent at 11/22/2024 10:15 AM EST -----  Regarding: Abnormal Stress Test  I was going to discuss abnormal stress test with patient at her follow-up on 12/6 but she asked her PCP about the results at her appt today.  Please call patient and let her know that PCP reached out and said patient was wanting results - and that while we will discuss in more detail at follow-up her stress test was abnormal- (but  not critical) and that we will discuss next options at follow-up (meaning she likely has some blockage but not at a critical amount).  She can keep 12/6 [place] appt, schedule a telehealth next week, or come to [place] office with me next week.     In the meantime - She cannot tolerate metoprolol so she will unlikely be able to tolerate Carvedilol but I would like for her to give it a try (it is heart protective).  Lipids goal will be under 70 and closer to 50 anuel.- we can see what the updated ones are that PCP ordered today then go from there. ASA 81m daily.

## 2024-11-22 NOTE — PROGRESS NOTES
Subjective   The ABCs of the Annual Wellness Visit   Medicare Wellness Visit      Lorena Zavala is a 69 y.o. patient who presents for a Medicare Wellness Visit.    The following portions of the patient's history were reviewed and   updated as appropriate: allergies, current medications, past family history, past medical history, past social history, past surgical history, and problem list.    Compared to one year ago, the patient's physical   health is the same.  Compared to one year ago, the patient's mental   health is the same.    Recent Hospitalizations:  She was not admitted to the hospital during the last year.     Current Medical Providers:  Patient Care Team:  Bonita Vaz,  as PCP - General (Family Medicine)    Outpatient Medications Prior to Visit   Medication Sig Dispense Refill    Cholecalciferol (Vitamin D3) 50 MCG (2000 UT) tablet Take  by mouth.      dilTIAZem XR (DILACOR XR) 180 MG 24 hr capsule Take 1 capsule by mouth Daily. 90 capsule 1    omeprazole (priLOSEC) 20 MG capsule Take 1 capsule by mouth Every Morning. 90 capsule 3    rosuvastatin (CRESTOR) 5 MG tablet Take 1 tablet by mouth Daily. 90 tablet 3    valsartan-hydrochlorothiazide (DIOVAN-HCT) 160-25 MG per tablet TAKE 1 TABLET BY MOUTH DAILY. 90 tablet 3     No facility-administered medications prior to visit.     No opioid medication identified on active medication list. I have reviewed chart for other potential  high risk medication/s and harmful drug interactions in the elderly.      Aspirin is not on active medication list.  Aspirin use is not indicated based on review of current medical condition/s. Risk of harm outweighs potential benefits.  .    Patient Active Problem List   Diagnosis    Dysfunction of both eustachian tubes    Epiretinal membrane (ERM) of both eyes    Bilateral retinoschisis    Left serous otitis media    Nuclear senile cataract    Perforation of left tympanic membrane    Tinnitus of left ear     "Encounter for wellness examination in adult    Encounter for screening mammogram for malignant neoplasm of breast    Menopausal syndrome    History of COVID-19    Right upper quadrant abdominal pain    Tachycardia    Middle ear effusion, bilateral    Left foot pain    Acute left ankle pain    Preop examination    Hypokalemia    Hypersomnia    Hyperlipidemia    GERD (gastroesophageal reflux disease)    Diverticulosis    Cholelithiasis    Sanders syndrome    Essential hypertension    Age-related osteoporosis without current pathological fracture    Vitamin D deficiency    Family history of early CAD    GABRIELA (obstructive sleep apnea)    Medicare annual wellness visit, subsequent    Abnormal stress test    Precordial chest pain    Palpitations     Advance Care Planning Advance Directive is not on file.  ACP discussion was held with the patient during this visit. Patient has an advance directive (not in EMR), copy requested.            Objective   Vitals:    11/22/24 0805   BP: 140/78   BP Location: Right arm   Patient Position: Sitting   Cuff Size: Adult   Pulse: 90   SpO2: 99%   Weight: 59.6 kg (131 lb 4.8 oz)   Height: 163.8 cm (64.5\")   PainSc: 0-No pain       Estimated body mass index is 22.19 kg/m² as calculated from the following:    Height as of this encounter: 163.8 cm (64.5\").    Weight as of this encounter: 59.6 kg (131 lb 4.8 oz).    BMI is within normal parameters. No other follow-up for BMI required.       Does the patient have evidence of cognitive impairment? No  Lab Results   Component Value Date    CHLPL 180 12/11/2024    TRIG 96 12/11/2024    HDL 71 12/11/2024    LDL 92 12/11/2024    VLDL 17 12/11/2024                                                                                                Health  Risk Assessment    Smoking Status:  Social History     Tobacco Use   Smoking Status Never    Passive exposure: Never   Smokeless Tobacco Never     Alcohol Consumption:  Social History     Substance and " Sexual Activity   Alcohol Use Yes    Alcohol/week: 2.0 standard drinks of alcohol    Types: 1 Glasses of wine, 1 Cans of beer per week       Fall Risk Screen  STEADI Fall Risk Assessment was completed, and patient is at LOW risk for falls.Assessment completed on:2024    Depression Screening   Little interest or pleasure in doing things? Not at all   Feeling down, depressed, or hopeless? Not at all   PHQ-2 Total Score 0      Health Habits and Functional and Cognitive Screenin/15/2024     9:37 AM   Functional & Cognitive Status   Do you have difficulty preparing food and eating? No    Do you have difficulty bathing yourself, getting dressed or grooming yourself? No    Do you have difficulty using the toilet? No    Do you have difficulty moving around from place to place? No    Do you have trouble with steps or getting out of a bed or a chair? No    Current Diet Well Balanced Diet    Dental Exam Up to date    Eye Exam Up to date    Exercise (times per week) Other    Current Exercises Include Gardening;House Cleaning;Walking    Do you need help using the phone?  No    Are you deaf or do you have serious difficulty hearing?  No    Do you need help to go to places out of walking distance? No    Do you need help shopping? No    Do you need help preparing meals?  No    Do you need help with housework?  No    Do you need help with laundry? No    Do you need help taking your medications? No    Do you need help managing money? No    Do you ever drive or ride in a car without wearing a seat belt? No    Have you felt unusual stress, anger or loneliness in the last month? No    Who do you live with? Spouse    If you need help, do you have trouble finding someone available to you? No    Have you been bothered in the last four weeks by sexual problems? No    Do you have difficulty concentrating, remembering or making decisions? No        Patient-reported           Age-appropriate Screening Schedule:  Refer to the  list below for future screening recommendations based on patient's age, sex and/or medical conditions. Orders for these recommended tests are listed in the plan section. The patient has been provided with a written plan.    Health Maintenance List  Health Maintenance   Topic Date Due    ANNUAL WELLNESS VISIT  05/24/2024    INFLUENZA VACCINE  03/31/2025 (Originally 7/1/2024)    COVID-19 Vaccine (1 - 2024-25 season) 09/16/2025 (Originally 9/1/2024)    HEPATITIS C SCREENING  09/25/2025 (Originally 3/25/2022)    DXA SCAN  05/31/2025    LIPID PANEL  12/11/2025    MAMMOGRAM  04/10/2026    COLORECTAL CANCER SCREENING  09/29/2031    Pneumococcal Vaccine 65+  Discontinued    TDAP/TD VACCINES  Discontinued    ZOSTER VACCINE  Discontinued                                                                                                                                                CMS Preventative Services Quick Reference  Risk Factors Identified During Encounter  Inactivity/Sedentary: Patient was advised to exercise at least 150 minutes a week per CDC recommendations.    The above risks/problems have been discussed with the patient.  Pertinent information has been shared with the patient in the After Visit Summary.  An After Visit Summary and PPPS were made available to the patient.    Follow Up:   Next Medicare Wellness visit to be scheduled in 1 year.         Additional E&M Note during same encounter follows:  Patient has additional, significant, and separately identifiable condition(s)/problem(s) that require work above and beyond the Medicare Wellness Visit     Chief Complaint  Medicare Wellness-subsequent    Subjective   HPI      Review of Systems   Constitutional:  Negative for chills, fatigue and fever.   Respiratory:  Negative for cough, shortness of breath and wheezing.    Cardiovascular:  Negative for chest pain, palpitations and leg swelling.   Gastrointestinal:  Negative for abdominal pain, constipation, diarrhea,  "nausea and vomiting.   Skin:  Negative for rash.   Neurological:  Negative for dizziness and weakness.   Psychiatric/Behavioral:  The patient is not nervous/anxious.               Objective   Vital Signs:  /78 (BP Location: Right arm, Patient Position: Sitting, Cuff Size: Adult)   Pulse 90   Ht 163.8 cm (64.5\")   Wt 59.6 kg (131 lb 4.8 oz)   SpO2 99%   BMI 22.19 kg/m²   Physical Exam  Vitals and nursing note reviewed.   Constitutional:       Appearance: Normal appearance.   HENT:      Right Ear: Tympanic membrane and ear canal normal.      Left Ear: Tympanic membrane and ear canal normal.      Mouth/Throat:      Mouth: Mucous membranes are moist.      Pharynx: Oropharynx is clear.   Eyes:      Pupils: Pupils are equal, round, and reactive to light.   Cardiovascular:      Rate and Rhythm: Normal rate and regular rhythm.      Heart sounds: Normal heart sounds.   Pulmonary:      Effort: Pulmonary effort is normal.      Breath sounds: Normal breath sounds.   Abdominal:      General: Bowel sounds are normal.      Palpations: Abdomen is soft.      Tenderness: There is no abdominal tenderness.   Musculoskeletal:      Cervical back: Neck supple.   Lymphadenopathy:      Cervical: No cervical adenopathy.   Neurological:      Mental Status: She is alert and oriented to person, place, and time. Mental status is at baseline.   Psychiatric:         Mood and Affect: Mood normal.         Behavior: Behavior normal.                       Assessment and Plan Additional age appropriate preventative wellness advice topics were discussed during today's preventative wellness exam(some topics already addressed during AWV portion of the note above):    Physical Activity: Advised cardiovascular activity 150 minutes per week as tolerated. (example brisk walk for 30 minutes, 5 days a week).     Nutrition: Discussed nutrition plan with patient. Information shared in after visit summary. Goal is for a well balanced diet to enhance " overall health.     Healthy Weight: Discussed current and goal BMI with patient. Steps to attain this goal discussed. Information shared in after visit summary.     Motor Vehicle Safety Discussion:  Wearing Seatbelt While in Motor Vehicle recommendation. Adhering to posted speed limit recommendation.    Diagnoses and all orders for this visit:    1. Medicare annual wellness visit, subsequent (Primary)  Assessment & Plan:  Labs today, UTD on health maintenance      2. Mixed hyperlipidemia  Assessment & Plan:   Lipid abnormalities are stable    Plan:  Continue same medication/s without change.      Discussed medication dosage, use, side effects, and goals of treatment in detail.    Counseled patient on lifestyle modifications to help control hyperlipidemia.     Patient Treatment Goals:   LDL goal is less than 70    Followup in 6 months.    Orders:  -     Lipid Panel; Future    3. Primary hypertension  Assessment & Plan:  Hypertension is stable and controlled  Continue current treatment regimen.  Blood pressure will be reassessed in 6 months.    Orders:  -     CBC Auto Differential; Future  -     Comprehensive Metabolic Panel; Future    4. Gastroesophageal reflux disease, unspecified whether esophagitis present  Assessment & Plan:  Stable on PPI      5. Vitamin D deficiency  -     Vitamin D,25-Hydroxy; Future    6. Screening for thyroid disorder  -     TSH; Future               Follow Up   Return in about 1 year (around 11/24/2025) for Annual physical, Medicare Wellness.  Patient was given instructions and counseling regarding her condition or for health maintenance advice. Please see specific information pulled into the AVS if appropriate.

## 2024-11-26 ENCOUNTER — TELEMEDICINE (OUTPATIENT)
Dept: CARDIOLOGY | Facility: CLINIC | Age: 69
End: 2024-11-26
Payer: MEDICARE

## 2024-11-26 DIAGNOSIS — R00.0 TACHYCARDIA: ICD-10-CM

## 2024-11-26 DIAGNOSIS — I10 ESSENTIAL HYPERTENSION: ICD-10-CM

## 2024-11-26 DIAGNOSIS — R94.39 ABNORMAL STRESS TEST: ICD-10-CM

## 2024-11-26 DIAGNOSIS — G47.33 OSA (OBSTRUCTIVE SLEEP APNEA): ICD-10-CM

## 2024-11-26 DIAGNOSIS — R00.2 PALPITATIONS: ICD-10-CM

## 2024-11-26 PROBLEM — R07.2 PRECORDIAL CHEST PAIN: Status: ACTIVE | Noted: 2024-11-26

## 2024-11-26 PROCEDURE — 1160F RVW MEDS BY RX/DR IN RCRD: CPT | Performed by: NURSE PRACTITIONER

## 2024-11-26 PROCEDURE — 99214 OFFICE O/P EST MOD 30 MIN: CPT | Performed by: NURSE PRACTITIONER

## 2024-11-26 PROCEDURE — 1159F MED LIST DOCD IN RCRD: CPT | Performed by: NURSE PRACTITIONER

## 2024-11-26 RX ORDER — ASPIRIN 81 MG/1
81 TABLET ORAL DAILY
Qty: 90 TABLET | Refills: 1 | Status: SHIPPED | OUTPATIENT
Start: 2024-11-26

## 2024-11-26 RX ORDER — CARVEDILOL 3.12 MG/1
3.12 TABLET ORAL DAILY
Qty: 30 TABLET | Refills: 1 | Status: SHIPPED | OUTPATIENT
Start: 2024-11-26

## 2024-11-26 NOTE — ASSESSMENT & PLAN NOTE
-Start ASA 81mg daily  -Start Carvedilol 3.125mg PO ONCE DAILY, will increase to BID if she tolerated once daily.  Was unable to tolerate Metoprolol r/t chest discomfort  -Continue Rosuvastatin 5mg daily  -intolerant to CPAP  -Coexisting tachycardia, palpitations, family HX early CAD, and resistant HTN despite being active and healthy weight.  Possible POTS  -Check C with Dr. Vasquez    Orders:    Case Request Cath Lab: Cardiac Catheterization/Vascular Study; Standing    Case Request Cath Lab: Cardiac Catheterization/Vascular Study    carvedilol (COREG) 3.125 MG tablet; Take 1 tablet by mouth Daily.

## 2024-11-26 NOTE — ASSESSMENT & PLAN NOTE
Orders:    Case Request Cath Lab: Cardiac Catheterization/Vascular Study; Standing    Case Request Cath Lab: Cardiac Catheterization/Vascular Study

## 2024-11-26 NOTE — ASSESSMENT & PLAN NOTE
-Intolerant to CPAP; Risk factor for CAD  Orders:    Case Request Cath Lab: Cardiac Catheterization/Vascular Study; Standing    Case Request Cath Lab: Cardiac Catheterization/Vascular Study

## 2024-11-26 NOTE — H&P (VIEW-ONLY)
[facility] Provider Note     Date:   2024   Name: Lorena Zavala  :   1955  PCP: Bonita Vaz,     Chief Complaint   Patient presents with    stress test results       Subjective     ..You have chosen to receive care through a telephone visit. Do you consent to use a telephone visit for your medical care today? Yes     ..This was an audio and video enabled telemedicine encounter.     History of Present Illness  Lorena Zavala is a 69 y.o. female who presents today for a telehealth visit to go over stress test results.    She continues to have some tachycardia and palpitations.  But overall she is doing well on diltiazem.  She has been unable to tolerate metoprolol related to having complaints of it causing chest discomfort.    She does have coexisting GABRIELA but has been intolerant to PAP therapy.  She continues to try to use it.    Today we reviewed her 2024 stress test.  2024 exercise stress test-intermediate risk.    I would like for her to try carvedilol 3.125 mg p.o.  Even though it supposed to be than twice daily we will try once a day and if that does not cause her chest discomfort will go up to twice a day.  I have also asked her to start aspirin 81 mg daily and to get the lipid panel that PCP ordered.    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  She is very active and is a healthy weight.  Therefore do not feel like this is deconditioning.  Was initially concern for POTS and that may also be part of what is going on but I do feel further testing is needed.    We discussed CCTA versus left heart cath.  Patient said she is the type of person that would rather just know and be done.  So she would like to move forward with left heart cath.  She has requested Dr. Vasquez as he performed her 's left  heart cath.  I am very comfortable with this plan.    We will see her back after left heart cath.  Sooner if needed.    1.  Tachycardia  2.  Palpitations  3.  Family history of premature CAD #4 history of COVID  4.  Resistant hypertension  5.  Hyperlipidemia  6.  Hypersomnia  7.  POTS?  8.  GABRIELA    Declines renal ultrasound    Unable to tolerate metoprolol related to chest pressure.  Has not tried other beta-blockers.  Has done okay as far as side effects with diltiazem but current dose is not controlling palpitations or tachycardia.      11/13/2024 exercise stress test-intermediate risk    10/10/2024 HST- Mild GABRIELA becoming moderate in REM sleep.  Overall AHI 12.5, supine 12.5, 21.9 REM.  Data did not reveal evidence of arrhythmia.    Allergies   Allergen Reactions    Penicillins Unknown - Low Severity    Sulfa Antibiotics Other (See Comments)       Current Outpatient Medications:     aspirin 81 MG EC tablet, Take 1 tablet by mouth Daily., Disp: 90 tablet, Rfl: 1    carvedilol (COREG) 3.125 MG tablet, Take 1 tablet by mouth Daily., Disp: 30 tablet, Rfl: 1    Cholecalciferol (Vitamin D3) 50 MCG (2000 UT) tablet, Take  by mouth., Disp: , Rfl:     dilTIAZem XR (DILACOR XR) 180 MG 24 hr capsule, Take 1 capsule by mouth Daily., Disp: 90 capsule, Rfl: 1    omeprazole (priLOSEC) 20 MG capsule, Take 1 capsule by mouth Every Morning., Disp: 90 capsule, Rfl: 3    rosuvastatin (CRESTOR) 5 MG tablet, Take 1 tablet by mouth Daily., Disp: 90 tablet, Rfl: 3    valsartan-hydrochlorothiazide (DIOVAN-HCT) 160-25 MG per tablet, TAKE 1 TABLET BY MOUTH DAILY., Disp: 90 tablet, Rfl: 3    Past Medical History:   Diagnosis Date    Sanders syndrome     Cancer 2018    Basal cell removed from nose    Cholelithiasis     Removed 5/8/24    Diverticulosis     GERD (gastroesophageal reflux disease) 2021    Hyperlipidemia     Hypertension     Sleep apnea       Past Surgical History:   Procedure Laterality Date    APPENDECTOMY      BUNIONECTOMY       " SECTION      CHOLECYSTECTOMY  24    COLONOSCOPY      EAR TUBES      MOLE REMOVAL      TONSILLECTOMY       Family History   Problem Relation Age of Onset    Stroke Mother 80    Arthritis Mother     Hyperlipidemia Mother     Hypertension Mother     Heart attack Father 55    Heart disease Father     Hyperlipidemia Father     Hypertension Father     Stroke Maternal Grandmother     Heart attack Maternal Grandfather     Diabetes Paternal Grandmother     Stroke Paternal Grandfather     Cancer Sister         melanoma     Social History     Socioeconomic History    Marital status:      Spouse name: Ed    Number of children: 1   Tobacco Use    Smoking status: Never     Passive exposure: Never    Smokeless tobacco: Never   Vaping Use    Vaping status: Never Used   Substance and Sexual Activity    Alcohol use: Yes     Alcohol/week: 2.0 standard drinks of alcohol     Types: 1 Glasses of wine, 1 Cans of beer per week    Drug use: Never    Sexual activity: Yes     Partners: Male     Birth control/protection: Post-menopausal       Objective     Vital Signs:  There were no vitals taken for this visit.  Estimated body mass index is 22.19 kg/m² as calculated from the following:    Height as of 24: 163.8 cm (64.5\").    Weight as of 24: 59.6 kg (131 lb 4.8 oz).         Physical Exam  Constitutional:       Comments: Telehealth Vist   Pulmonary:      Effort: Pulmonary effort is normal.   Neurological:      Mental Status: She is alert and oriented to person, place, and time.   Psychiatric:         Mood and Affect: Mood normal.                     Assessment and Plan     Assessment & Plan  Abnormal stress test  -Start ASA 81mg daily  -Start Carvedilol 3.125mg PO ONCE DAILY, will increase to BID if she tolerated once daily.  Was unable to tolerate Metoprolol r/t chest discomfort  -Continue Rosuvastatin 5mg daily  -intolerant to CPAP  -Coexisting tachycardia, palpitations, family HX early CAD, and resistant " HTN despite being active and healthy weight.  Possible POTS  -Check LHC with Dr. Vasquez    Orders:    Case Request Cath Lab: Cardiac Catheterization/Vascular Study; Standing    Case Request Cath Lab: Cardiac Catheterization/Vascular Study    carvedilol (COREG) 3.125 MG tablet; Take 1 tablet by mouth Daily.    Tachycardia    Orders:    Case Request Cath Lab: Cardiac Catheterization/Vascular Study; Standing    Case Request Cath Lab: Cardiac Catheterization/Vascular Study    GABRIELA (obstructive sleep apnea)  -Intolerant to CPAP; Risk factor for CAD  Orders:    Case Request Cath Lab: Cardiac Catheterization/Vascular Study; Standing    Case Request Cath Lab: Cardiac Catheterization/Vascular Study    Essential hypertension      Orders:    Case Request Cath Lab: Cardiac Catheterization/Vascular Study; Standing    Case Request Cath Lab: Cardiac Catheterization/Vascular Study    Palpitations    Orders:    Case Request Cath Lab: Cardiac Catheterization/Vascular Study; Standing    Case Request Cath Lab: Cardiac Catheterization/Vascular Study    Other orders    Pre-Procedure IV Hydration Not Needed; Standing    aspirin 81 MG EC tablet; Take 1 tablet by mouth Daily.      Recommendations: ER if symptoms increase, Report if any new/changing symptoms immediately, Sleep risks reviewed (driving, medical, sleep death, sedating agents), Sleep hygiene discussed, and GABRIELA precautions for anesthesia      Follow Up  Return for After LHC.    Nery Flaherty, APRN   11/26/2024     Please note that this explicitly excludes time spent on other separate billable services such as performing procedures or test interpretation, when applicable.    This note was created using dictation software which occasionally transcribes nonsensical phrases. Please contact the provider if any clarification is needed.

## 2024-12-04 ENCOUNTER — PREP FOR SURGERY (OUTPATIENT)
Dept: OTHER | Facility: HOSPITAL | Age: 69
End: 2024-12-04
Payer: MEDICARE

## 2024-12-04 RX ORDER — SODIUM CHLORIDE 9 MG/ML
40 INJECTION, SOLUTION INTRAVENOUS AS NEEDED
OUTPATIENT
Start: 2024-12-04

## 2024-12-04 RX ORDER — ACETAMINOPHEN 325 MG/1
650 TABLET ORAL EVERY 4 HOURS PRN
OUTPATIENT
Start: 2024-12-04

## 2024-12-04 RX ORDER — ASPIRIN 81 MG/1
81 TABLET ORAL DAILY
OUTPATIENT
Start: 2024-12-05

## 2024-12-04 RX ORDER — SODIUM CHLORIDE 0.9 % (FLUSH) 0.9 %
10 SYRINGE (ML) INJECTION EVERY 12 HOURS SCHEDULED
OUTPATIENT
Start: 2024-12-04

## 2024-12-04 RX ORDER — SODIUM CHLORIDE 0.9 % (FLUSH) 0.9 %
10 SYRINGE (ML) INJECTION AS NEEDED
OUTPATIENT
Start: 2024-12-04

## 2024-12-04 RX ORDER — NITROGLYCERIN 0.4 MG/1
0.4 TABLET SUBLINGUAL
OUTPATIENT
Start: 2024-12-04

## 2024-12-04 RX ORDER — ASPIRIN 81 MG/1
324 TABLET, CHEWABLE ORAL ONCE
OUTPATIENT
Start: 2024-12-04 | End: 2024-12-04

## 2024-12-11 ENCOUNTER — LAB (OUTPATIENT)
Dept: FAMILY MEDICINE CLINIC | Facility: CLINIC | Age: 69
End: 2024-12-11
Payer: MEDICARE

## 2024-12-11 DIAGNOSIS — Z13.29 SCREENING FOR THYROID DISORDER: ICD-10-CM

## 2024-12-11 DIAGNOSIS — E55.9 VITAMIN D DEFICIENCY: ICD-10-CM

## 2024-12-11 DIAGNOSIS — I10 PRIMARY HYPERTENSION: ICD-10-CM

## 2024-12-11 DIAGNOSIS — E78.2 MIXED HYPERLIPIDEMIA: ICD-10-CM

## 2024-12-12 LAB
25(OH)D3+25(OH)D2 SERPL-MCNC: 46.3 NG/ML (ref 30–100)
ALBUMIN SERPL-MCNC: 4.4 G/DL (ref 3.9–4.9)
ALP SERPL-CCNC: 91 IU/L (ref 44–121)
ALT SERPL-CCNC: 17 IU/L (ref 0–32)
AST SERPL-CCNC: 16 IU/L (ref 0–40)
BASOPHILS # BLD AUTO: 0.1 X10E3/UL (ref 0–0.2)
BASOPHILS NFR BLD AUTO: 1 %
BILIRUB SERPL-MCNC: 1.1 MG/DL (ref 0–1.2)
BUN SERPL-MCNC: 12 MG/DL (ref 8–27)
BUN/CREAT SERPL: 17 (ref 12–28)
CALCIUM SERPL-MCNC: 9.5 MG/DL (ref 8.7–10.3)
CHLORIDE SERPL-SCNC: 103 MMOL/L (ref 96–106)
CHOLEST SERPL-MCNC: 180 MG/DL (ref 100–199)
CO2 SERPL-SCNC: 22 MMOL/L (ref 20–29)
CREAT SERPL-MCNC: 0.72 MG/DL (ref 0.57–1)
EGFRCR SERPLBLD CKD-EPI 2021: 92 ML/MIN/1.73
EOSINOPHIL # BLD AUTO: 0.2 X10E3/UL (ref 0–0.4)
EOSINOPHIL NFR BLD AUTO: 4 %
ERYTHROCYTE [DISTWIDTH] IN BLOOD BY AUTOMATED COUNT: 12.3 % (ref 11.7–15.4)
GLOBULIN SER CALC-MCNC: 2.3 G/DL (ref 1.5–4.5)
GLUCOSE SERPL-MCNC: 100 MG/DL (ref 70–99)
HCT VFR BLD AUTO: 42.3 % (ref 34–46.6)
HDLC SERPL-MCNC: 71 MG/DL
HGB BLD-MCNC: 14 G/DL (ref 11.1–15.9)
IMM GRANULOCYTES # BLD AUTO: 0 X10E3/UL (ref 0–0.1)
IMM GRANULOCYTES NFR BLD AUTO: 0 %
LDLC SERPL CALC-MCNC: 92 MG/DL (ref 0–99)
LYMPHOCYTES # BLD AUTO: 1.7 X10E3/UL (ref 0.7–3.1)
LYMPHOCYTES NFR BLD AUTO: 35 %
MCH RBC QN AUTO: 31.7 PG (ref 26.6–33)
MCHC RBC AUTO-ENTMCNC: 33.1 G/DL (ref 31.5–35.7)
MCV RBC AUTO: 96 FL (ref 79–97)
MONOCYTES # BLD AUTO: 0.6 X10E3/UL (ref 0.1–0.9)
MONOCYTES NFR BLD AUTO: 12 %
NEUTROPHILS # BLD AUTO: 2.3 X10E3/UL (ref 1.4–7)
NEUTROPHILS NFR BLD AUTO: 48 %
PLATELET # BLD AUTO: 365 X10E3/UL (ref 150–450)
POTASSIUM SERPL-SCNC: 4 MMOL/L (ref 3.5–5.2)
PROT SERPL-MCNC: 6.7 G/DL (ref 6–8.5)
RBC # BLD AUTO: 4.42 X10E6/UL (ref 3.77–5.28)
SODIUM SERPL-SCNC: 140 MMOL/L (ref 134–144)
TRIGL SERPL-MCNC: 96 MG/DL (ref 0–149)
TSH SERPL DL<=0.005 MIU/L-ACNC: 2.66 UIU/ML (ref 0.45–4.5)
VLDLC SERPL CALC-MCNC: 17 MG/DL (ref 5–40)
WBC # BLD AUTO: 4.8 X10E3/UL (ref 3.4–10.8)

## 2024-12-18 ENCOUNTER — HOSPITAL ENCOUNTER (OUTPATIENT)
Facility: HOSPITAL | Age: 69
Setting detail: HOSPITAL OUTPATIENT SURGERY
Discharge: HOME | End: 2024-12-18
Attending: INTERNAL MEDICINE | Admitting: NURSE PRACTITIONER
Payer: MEDICARE

## 2024-12-18 VITALS
RESPIRATION RATE: 15 BRPM | HEART RATE: 70 BPM | BODY MASS INDEX: 22.53 KG/M2 | DIASTOLIC BLOOD PRESSURE: 73 MMHG | HEIGHT: 64 IN | TEMPERATURE: 98.4 F | WEIGHT: 132 LBS | OXYGEN SATURATION: 98 % | SYSTOLIC BLOOD PRESSURE: 131 MMHG

## 2024-12-18 DIAGNOSIS — I10 ESSENTIAL HYPERTENSION: ICD-10-CM

## 2024-12-18 DIAGNOSIS — R00.2 PALPITATIONS: ICD-10-CM

## 2024-12-18 DIAGNOSIS — R00.0 TACHYCARDIA: ICD-10-CM

## 2024-12-18 DIAGNOSIS — R94.39 ABNORMAL STRESS TEST: ICD-10-CM

## 2024-12-18 DIAGNOSIS — G47.33 OSA (OBSTRUCTIVE SLEEP APNEA): ICD-10-CM

## 2024-12-18 LAB — HBA1C MFR BLD: 5.3 % (ref 4.8–5.6)

## 2024-12-18 PROCEDURE — C1769 GUIDE WIRE: HCPCS | Performed by: INTERNAL MEDICINE

## 2024-12-18 PROCEDURE — 93458 L HRT ARTERY/VENTRICLE ANGIO: CPT | Performed by: INTERNAL MEDICINE

## 2024-12-18 PROCEDURE — 25510000002 IODIXANOL PER 1 ML: Performed by: INTERNAL MEDICINE

## 2024-12-18 PROCEDURE — C1760 CLOSURE DEV, VASC: HCPCS | Performed by: INTERNAL MEDICINE

## 2024-12-18 PROCEDURE — 36415 COLL VENOUS BLD VENIPUNCTURE: CPT

## 2024-12-18 PROCEDURE — 25510000001 IOPAMIDOL PER 1 ML: Performed by: INTERNAL MEDICINE

## 2024-12-18 PROCEDURE — 25010000002 LIDOCAINE PF 1% 1 % SOLUTION: Performed by: INTERNAL MEDICINE

## 2024-12-18 PROCEDURE — C1894 INTRO/SHEATH, NON-LASER: HCPCS | Performed by: INTERNAL MEDICINE

## 2024-12-18 PROCEDURE — 83036 HEMOGLOBIN GLYCOSYLATED A1C: CPT | Performed by: PHYSICIAN ASSISTANT

## 2024-12-18 PROCEDURE — 25010000002 MIDAZOLAM PER 1 MG: Performed by: INTERNAL MEDICINE

## 2024-12-18 PROCEDURE — 25810000003 SODIUM CHLORIDE 0.9 % SOLUTION: Performed by: PHYSICIAN ASSISTANT

## 2024-12-18 PROCEDURE — 25010000002 FENTANYL CITRATE (PF) 50 MCG/ML SOLUTION: Performed by: INTERNAL MEDICINE

## 2024-12-18 PROCEDURE — 25010000002 NICARDIPINE 2.5 MG/ML SOLUTION: Performed by: INTERNAL MEDICINE

## 2024-12-18 RX ORDER — ASPIRIN 81 MG/1
324 TABLET, CHEWABLE ORAL ONCE
Status: COMPLETED | OUTPATIENT
Start: 2024-12-18 | End: 2024-12-18

## 2024-12-18 RX ORDER — NITROGLYCERIN 0.4 MG/1
0.4 TABLET SUBLINGUAL
Status: DISCONTINUED | OUTPATIENT
Start: 2024-12-18 | End: 2024-12-18 | Stop reason: HOSPADM

## 2024-12-18 RX ORDER — HEPARIN SODIUM 1000 [USP'U]/ML
INJECTION, SOLUTION INTRAVENOUS; SUBCUTANEOUS
Status: DISCONTINUED | OUTPATIENT
Start: 2024-12-18 | End: 2024-12-18 | Stop reason: HOSPADM

## 2024-12-18 RX ORDER — IODIXANOL 320 MG/ML
INJECTION, SOLUTION INTRAVASCULAR
Status: DISCONTINUED | OUTPATIENT
Start: 2024-12-18 | End: 2024-12-18 | Stop reason: HOSPADM

## 2024-12-18 RX ORDER — FENTANYL CITRATE 50 UG/ML
INJECTION, SOLUTION INTRAMUSCULAR; INTRAVENOUS
Status: DISCONTINUED | OUTPATIENT
Start: 2024-12-18 | End: 2024-12-18 | Stop reason: HOSPADM

## 2024-12-18 RX ORDER — LIDOCAINE HYDROCHLORIDE 10 MG/ML
INJECTION, SOLUTION EPIDURAL; INFILTRATION; INTRACAUDAL; PERINEURAL
Status: DISCONTINUED | OUTPATIENT
Start: 2024-12-18 | End: 2024-12-18 | Stop reason: HOSPADM

## 2024-12-18 RX ORDER — ASPIRIN 81 MG/1
81 TABLET ORAL DAILY
Status: DISCONTINUED | OUTPATIENT
Start: 2024-12-19 | End: 2024-12-18 | Stop reason: HOSPADM

## 2024-12-18 RX ORDER — SODIUM CHLORIDE 0.9 % (FLUSH) 0.9 %
10 SYRINGE (ML) INJECTION EVERY 12 HOURS SCHEDULED
Status: DISCONTINUED | OUTPATIENT
Start: 2024-12-18 | End: 2024-12-18 | Stop reason: HOSPADM

## 2024-12-18 RX ORDER — MIDAZOLAM HYDROCHLORIDE 1 MG/ML
INJECTION, SOLUTION INTRAMUSCULAR; INTRAVENOUS
Status: DISCONTINUED | OUTPATIENT
Start: 2024-12-18 | End: 2024-12-18 | Stop reason: HOSPADM

## 2024-12-18 RX ORDER — SODIUM CHLORIDE 0.9 % (FLUSH) 0.9 %
10 SYRINGE (ML) INJECTION AS NEEDED
Status: DISCONTINUED | OUTPATIENT
Start: 2024-12-18 | End: 2024-12-18 | Stop reason: HOSPADM

## 2024-12-18 RX ORDER — IOPAMIDOL 755 MG/ML
INJECTION, SOLUTION INTRAVASCULAR
Status: DISCONTINUED | OUTPATIENT
Start: 2024-12-18 | End: 2024-12-18 | Stop reason: HOSPADM

## 2024-12-18 RX ORDER — ACETAMINOPHEN 325 MG/1
650 TABLET ORAL EVERY 4 HOURS PRN
Status: DISCONTINUED | OUTPATIENT
Start: 2024-12-18 | End: 2024-12-18 | Stop reason: HOSPADM

## 2024-12-18 RX ORDER — SODIUM CHLORIDE 9 MG/ML
40 INJECTION, SOLUTION INTRAVENOUS AS NEEDED
Status: DISCONTINUED | OUTPATIENT
Start: 2024-12-18 | End: 2024-12-18 | Stop reason: HOSPADM

## 2024-12-18 RX ADMIN — SODIUM CHLORIDE 178.8 ML: 9 INJECTION, SOLUTION INTRAVENOUS at 09:51

## 2024-12-18 RX ADMIN — ASPIRIN 81 MG 324 MG: 81 TABLET ORAL at 09:51

## 2024-12-18 NOTE — INTERVAL H&P NOTE
H&P reviewed. The patient was examined and there are no changes to the H&P.      Vitals and Labs today:  There were no vitals filed for this visit.    Lab Results   Component Value Date    WBC 4.8 12/11/2024    HGB 14.0 12/11/2024    HCT 42.3 12/11/2024    MCV 96 12/11/2024     12/11/2024     Lab Results   Component Value Date    GLUCOSE 100 (H) 12/11/2024    BUN 12 12/11/2024    CREATININE 0.72 12/11/2024     12/11/2024    K 4.0 12/11/2024     12/11/2024    CALCIUM 9.5 12/11/2024    PROTEINTOT 6.7 05/01/2024    ALBUMIN 4.4 12/11/2024    ALT 17 12/11/2024    AST 16 12/11/2024    ALKPHOS 91 12/11/2024    BILITOT 1.1 12/11/2024    GLOB 2.4 05/01/2024    AGRATIO 1.8 05/01/2024    BCR 17 12/11/2024    ANIONGAP 8.7 05/01/2024    EGFR 84.7 05/01/2024     Lab Results   Component Value Date    HGBA1C 5.6 05/31/2023     Lab Results   Component Value Date    CHLPL 180 12/11/2024    TRIG 96 12/11/2024    HDL 71 12/11/2024    LDL 92 12/11/2024     The risks, benefits, and alternative options of cardiac catheterization were discussed with the patient.  The risks include death, MI, stroke, infection, vascular injury requiring surgical repair and/or blood transfusion, coronary dissection, renal dysfunction/failure, allergic reaction, emergent CABG.  If PCI is needed there is a 1-2% risk of emergent CABG.  The patient is agreeable for cardiac catheterization, possible PCI or CABG. Plan is to proceed with cardiac catheterization and possible PCI.     Camilo Vasquez MD, [facility], [facility]  Interventional Cardiology  12/18/24  13:17 EST

## 2024-12-26 ENCOUNTER — HOSPITAL ENCOUNTER (EMERGENCY)
Facility: HOSPITAL | Age: 69
Discharge: HOME | End: 2024-12-26
Attending: STUDENT IN AN ORGANIZED HEALTH CARE EDUCATION/TRAINING PROGRAM
Payer: MEDICARE

## 2024-12-26 ENCOUNTER — TELEPHONE (OUTPATIENT)
Dept: CARDIOLOGY | Facility: CLINIC | Age: 69
End: 2024-12-26
Payer: MEDICARE

## 2024-12-26 VITALS
HEIGHT: 64 IN | OXYGEN SATURATION: 97 % | WEIGHT: 130 LBS | HEART RATE: 89 BPM | RESPIRATION RATE: 18 BRPM | TEMPERATURE: 98.1 F | BODY MASS INDEX: 22.2 KG/M2 | SYSTOLIC BLOOD PRESSURE: 173 MMHG | DIASTOLIC BLOOD PRESSURE: 85 MMHG

## 2024-12-26 DIAGNOSIS — R58 EXTRAVASATION OF BLOOD: ICD-10-CM

## 2024-12-26 DIAGNOSIS — T82.898A HEMATOMA OF INTRAVENOUS CATHETER SITE, INITIAL ENCOUNTER: Primary | ICD-10-CM

## 2024-12-26 DIAGNOSIS — M65.939: ICD-10-CM

## 2024-12-26 PROCEDURE — 99283 EMERGENCY DEPT VISIT LOW MDM: CPT

## 2024-12-26 NOTE — TELEPHONE ENCOUNTER
Patient called with concerns over recent heart cath, right radial access. Patient stated that she is having difficulty flexing her wrist, pain radiating into her hand up and up to her elbow. Patient states that the pain wakes her up at night and that the pain is getting worse. Advised patient to go to the emergency room. Patient understood.

## 2024-12-27 NOTE — DISCHARGE INSTRUCTIONS
Apply warm compress at least twice daily to the area on your right wrist, take ibuprofen 600 mg 3 times per day with meals, and a topical agent, including Voltaren, Tiger balm, Bengay, IcyHot is appropriate.  Follow-up with Dr. Arcos the hand and wrist specialist if indicated.

## 2024-12-27 NOTE — ED PROVIDER NOTES
Subjective   History of Present Illness patient is a pleasant 69-year-old accompanied by her , who presents to the ED with concern over the right radial wrist heart catheterization attempt access site from , which has had bruising, and ongoing pain including with flexion extension of the right wrist.  She reports Dr. 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  She denies previous history of blood clots, does not take blood thinning medicines.    Review of Systems   Constitutional: Negative.    Respiratory: Negative.  Negative for shortness of breath.    Cardiovascular: Negative.  Negative for chest pain.   Skin:  Positive for wound.   Neurological: Negative.        Past Medical History:   Diagnosis Date    Sanders syndrome     Cancer 2018    Basal cell removed from nose    Cholelithiasis     Removed 24    Diverticulosis     GERD (gastroesophageal reflux disease)     Hyperlipidemia     Hypertension     Sleep apnea        Allergies   Allergen Reactions    Penicillins Unknown - Low Severity    Percocet [Oxycodone-Acetaminophen] Itching    Sulfa Antibiotics Other (See Comments)       Past Surgical History:   Procedure Laterality Date    APPENDECTOMY      BUNIONECTOMY      CARDIAC CATHETERIZATION N/A 2024    Procedure: Left Heart Cath - Right radial access;  Surgeon: Camilo Vasquez MD;  Location: [facility] CATH INVASIVE LOCATION;  Service: Cardiovascular;  Laterality: N/A;     SECTION      CHOLECYSTECTOMY  24    COLONOSCOPY      EAR TUBES      MOLE REMOVAL      TONSILLECTOMY         Family History   Problem Relation Age of Onset    Stroke Mother 80    Arthritis Mother     Hyperlipidemia Mother     Hypertension Mother     Heart attack Father 55    Heart disease Father     Hyperlipidemia Father      Hypertension Father     Stroke Maternal Grandmother     Heart attack Maternal Grandfather     Diabetes Paternal Grandmother     Stroke Paternal Grandfather     Cancer Sister         melanoma       Social History     Socioeconomic History    Marital status:      Spouse name: Ed    Number of children: 1   Tobacco Use    Smoking status: Never     Passive exposure: Never    Smokeless tobacco: Never   Vaping Use    Vaping status: Never Used   Substance and Sexual Activity    Alcohol use: Yes     Alcohol/week: 2.0 standard drinks of alcohol     Types: 1 Glasses of wine, 1 Cans of beer per week    Drug use: Never    Sexual activity: Yes     Partners: Male     Birth control/protection: Post-menopausal           Objective   Physical Exam  Constitutional:       Appearance: Normal appearance. She is not ill-appearing.   Eyes:      Extraocular Movements: Extraocular movements intact.      Pupils: Pupils are equal, round, and reactive to light.   Cardiovascular:      Rate and Rhythm: Normal rate.   Pulmonary:      Effort: Pulmonary effort is normal.      Breath sounds: Normal breath sounds.   Musculoskeletal:         General: Tenderness present. Normal range of motion.      Right wrist: Tenderness and bony tenderness present. No swelling or deformity. Normal pulse.      Cervical back: Normal range of motion.      Comments: The right radial pulses at 2+, with even regular frequency   Skin:     General: Skin is warm and dry.      Capillary Refill: Capillary refill takes less than 2 seconds.      Findings: Bruising and ecchymosis present.      Comments: See uploaded clinical image.   Neurological:      General: No focal deficit present.      Mental Status: She is alert and oriented to person, place, and time.         Procedures               ED Course  ED Course as of 12/26/24 2021   Thu Dec 26, 2024   1949 Initial evaluation the patient results waiting room 2, accompanied by her . [JH]      ED Course User Index  [JH]  Obdulio Martinez APRN                                                       Medical Decision Making  Given the patient's complaints, in the setting of the etiology for the right wrist pain and bruising, with a successful sheath access site in the wrist during a left heart cath, recommended follow-up with her cardiologist although their office referred her here today, will provide referral to the hand and wrist specialist, recommendations for warm compresses applied, topical and oral analgesic options.  Patient is agreeable with this plan.        Final diagnoses:   Hematoma of intravenous catheter site, initial encounter   Synovitis and tenosynovitis of wrist   Extravasation of blood       ED Disposition  ED Disposition       ED Disposition   Discharge    Condition   Stable    Comment   --               Mary Arcos MD  [address] [facility]  [address]  [place] [id number]  [phone]          Camilo Vasquez MD  [address] [facility]  [address]  [place] [id number]  [phone]      As needed         Medication List      No changes were made to your prescriptions during this visit.            Obdulio Martinez APRN  12/26/24 [id number]

## 2025-01-03 ENCOUNTER — TELEPHONE (OUTPATIENT)
Dept: FAMILY MEDICINE CLINIC | Facility: CLINIC | Age: 70
End: 2025-01-03
Payer: MEDICARE

## 2025-01-15 DIAGNOSIS — R94.39 ABNORMAL STRESS TEST: ICD-10-CM

## 2025-01-15 RX ORDER — CARVEDILOL 3.12 MG/1
3.12 TABLET ORAL DAILY
Qty: 30 TABLET | Refills: 1 | Status: SHIPPED | OUTPATIENT
Start: 2025-01-15 | End: 2025-01-17 | Stop reason: SDUPTHER

## 2025-01-17 ENCOUNTER — OFFICE VISIT (OUTPATIENT)
Age: 70
End: 2025-01-17
Payer: MEDICARE

## 2025-01-17 VITALS
HEART RATE: 72 BPM | SYSTOLIC BLOOD PRESSURE: 134 MMHG | WEIGHT: 133 LBS | DIASTOLIC BLOOD PRESSURE: 71 MMHG | HEIGHT: 64 IN | BODY MASS INDEX: 22.71 KG/M2 | OXYGEN SATURATION: 97 %

## 2025-01-17 DIAGNOSIS — R07.2 PRECORDIAL CHEST PAIN: ICD-10-CM

## 2025-01-17 DIAGNOSIS — R94.39 ABNORMAL STRESS TEST: ICD-10-CM

## 2025-01-17 DIAGNOSIS — R00.0 TACHYCARDIA: ICD-10-CM

## 2025-01-17 DIAGNOSIS — I10 PRIMARY HYPERTENSION: ICD-10-CM

## 2025-01-17 DIAGNOSIS — R00.2 PALPITATIONS: ICD-10-CM

## 2025-01-17 DIAGNOSIS — G47.33 OSA (OBSTRUCTIVE SLEEP APNEA): ICD-10-CM

## 2025-01-17 DIAGNOSIS — E78.2 MIXED HYPERLIPIDEMIA: ICD-10-CM

## 2025-01-17 PROBLEM — G47.10 HYPERSOMNIA: Status: RESOLVED | Noted: 2024-09-25 | Resolved: 2025-01-17

## 2025-01-17 PROCEDURE — 99214 OFFICE O/P EST MOD 30 MIN: CPT | Performed by: NURSE PRACTITIONER

## 2025-01-17 PROCEDURE — 3075F SYST BP GE 130 - 139MM HG: CPT | Performed by: NURSE PRACTITIONER

## 2025-01-17 PROCEDURE — 3078F DIAST BP <80 MM HG: CPT | Performed by: NURSE PRACTITIONER

## 2025-01-17 RX ORDER — VALSARTAN AND HYDROCHLOROTHIAZIDE 160; 25 MG/1; MG/1
1 TABLET ORAL DAILY
Qty: 90 TABLET | Refills: 1 | Status: SHIPPED | OUTPATIENT
Start: 2025-01-17

## 2025-01-17 RX ORDER — ROSUVASTATIN CALCIUM 5 MG/1
5 TABLET, COATED ORAL DAILY
Qty: 90 TABLET | Refills: 1 | Status: SHIPPED | OUTPATIENT
Start: 2025-01-17

## 2025-01-17 RX ORDER — CARVEDILOL 3.12 MG/1
3.12 TABLET ORAL DAILY
Qty: 90 TABLET | Refills: 1 | Status: SHIPPED | OUTPATIENT
Start: 2025-01-17

## 2025-01-17 RX ORDER — DILTIAZEM HYDROCHLORIDE 180 MG/1
180 CAPSULE, EXTENDED RELEASE ORAL DAILY
Qty: 90 CAPSULE | Refills: 1 | Status: SHIPPED | OUTPATIENT
Start: 2025-01-17

## 2025-01-17 NOTE — ASSESSMENT & PLAN NOTE
Unable to tolerate CPAP  Doing well with dental device from [facility]  Declines repeating HST with dental device

## 2025-01-17 NOTE — ASSESSMENT & PLAN NOTE
Hypertension is stable and controlled  Continue current treatment regimen.  Blood pressure will be reassessed in 6 months.

## 2025-01-17 NOTE — ASSESSMENT & PLAN NOTE
LHC with NCA  Unable to tolerate Metoprolol - caused chest pain  Doing well with Carvedilol once a day (not 2x daily)

## 2025-01-17 NOTE — PROGRESS NOTES
[facility] Provider Note     Date:   2025   Name: Lorena Zavala  :   1955  PCP: Provider, No Known         History of Present Illness    Lorena Zavala is a 69 y.o. female who presents today for follow-up on GABRIELA, tachycardia, palpitations, hypertension, and hyperlipidemia.    Her recent heart cath showed normal coronary arteries so she was advised that she could stop taking aspirin.  She is tolerating carvedilol once a day-not twice a day-for tachycardia.  She said she does feel better with it and feels like she is able to rest better at night as well as exercise and do her activities of daily living.    Currently still on valsartan/hydrochlorothiazide for blood pressure as well as diltiazem.  Rosuvastatin 5 mg for hyperlipidemia.    She was intolerant to PAP therapy but had a dental device made with [employer].  She said it has made a big difference.  She feels like her sleep problems are decreasing.  Her Huntington score is a 0.  She denies excessive daytime sleepiness and denies having had a car accident or near miss because of falling asleep driving.  She does not take naps.  She does not smoke.  And she only drinks 1-2 times a week.    She declines repeating home sleep study with dental device in place.    Her PCP recently left [facility] so she is searching for a new one.  Her GYN retired.    Will see her back in 6 months.  Sooner if needed.      Cardiology and Sleep Related History:    1.  Tachycardia  2.  Palpitations  3.  Family history of premature CAD #4 history of COVID  4.  Resistant hypertension  5.  Hyperlipidemia  6.  Hypersomnia  7.  POTS?  8.  GABRIELA    Declines renal ultrasound    Unable to tolerate metoprolol related to chest pressure.  Has not tried other beta-blockers.  Has done okay as far as side effects with diltiazem but current dose is not controlling palpitations or tachycardia.    2024 [facility]- NCA but with artery spasms so had to go in  femorally.    11/13/2024 exercise stress test-intermediate risk    10/10/2024 HST- Mild GABRIELA becoming moderate in REM sleep.  Overall AHI 12.5, supine 12.5, 21.9 REM.  Data did not reveal evidence of arrhythmia.    Problems Addressed this Visit       Tachycardia     Unable to tolerate Metoprolol - caused chest pain  Doing well with Carvedilol once a day (not 2x daily)         Hyperlipidemia     LHC with NCA so LDL goal less than 100    Continue Rosuvastatin 5mg qhs         Relevant Medications    rosuvastatin (CRESTOR) 5 MG tablet    Hypertension     Hypertension is stable and controlled  Continue current treatment regimen.  Blood pressure will be reassessed in 6 months.            Relevant Medications    carvedilol (COREG) 3.125 MG tablet    dilTIAZem XR (DILACOR XR) 180 MG 24 hr capsule    valsartan-hydrochlorothiazide (DIOVAN-HCT) 160-25 MG per tablet    GABRIELA (obstructive sleep apnea)     Unable to tolerate CPAP  Doing well with dental device from [facility] repeating HST with dental device         Abnormal stress test     Ok to stop ASA. 12/18/24: Normal LHC at [facility].         Relevant Medications    carvedilol (COREG) 3.125 MG tablet    Precordial chest pain     [facility] with NCA  Unable to tolerate Metoprolol - caused chest pain  Doing well with Carvedilol once a day (not 2x daily)         Palpitations     Unable to tolerate Metoprolol - caused chest pain  Doing well with Carvedilol once a day (not 2x daily)          Diagnoses         Codes Comments    Abnormal stress test     ICD-10-CM: R94.39  ICD-9-CM: 794.39     Mixed hyperlipidemia     ICD-10-CM: E78.2  ICD-9-CM: 272.2     Primary hypertension     ICD-10-CM: I10  ICD-9-CM: 401.9     GABRIELA (obstructive sleep apnea)     ICD-10-CM: G47.33  ICD-9-CM: 327.23     Palpitations     ICD-10-CM: R00.2  ICD-9-CM: 785.1     Precordial chest pain     ICD-10-CM: R07.2  ICD-9-CM: 786.51     Tachycardia     ICD-10-CM: R00.0  ICD-9-CM: 785.0              Medications  Discontinued During This Encounter   Medication Reason    aspirin 81 MG EC tablet Discontinued by another clinician    valsartan-hydrochlorothiazide (DIOVAN-HCT) 160-25 MG per tablet Reorder    omeprazole (priLOSEC) 20 MG capsule Reorder    rosuvastatin (CRESTOR) 5 MG tablet Reorder    dilTIAZem XR (DILACOR XR) 180 MG 24 hr capsule Reorder    carvedilol (COREG) 3.125 MG tablet Reorder        The 10-year ASCVD risk score (Tamera GUAMAN, et al., 2019) is: 8.8%    Values used to calculate the score:      Age: 69 years      Sex: Female      Is Non- : No      Diabetic: No      Tobacco smoker: No      Systolic Blood Pressure: 134 mmHg      Is BP treated: Yes      HDL Cholesterol: 71 mg/dL      Total Cholesterol: 180 mg/dL     Allergies   Allergen Reactions    Penicillins Unknown - Low Severity    Percocet [Oxycodone-Acetaminophen] Itching    Sulfa Antibiotics Unknown - Low Severity         Current Outpatient Medications:     carvedilol (COREG) 3.125 MG tablet, Take 1 tablet by mouth Daily., Disp: 90 tablet, Rfl: 1    Cholecalciferol (Vitamin D3) 50 MCG (2000 UT) tablet, Take  by mouth., Disp: , Rfl:     dilTIAZem XR (DILACOR XR) 180 MG 24 hr capsule, Take 1 capsule by mouth Daily., Disp: 90 capsule, Rfl: 1    omeprazole (priLOSEC) 20 MG capsule, Take 1 capsule by mouth Every Morning., Disp: 90 capsule, Rfl: 3    rosuvastatin (CRESTOR) 5 MG tablet, Take 1 tablet by mouth Daily., Disp: 90 tablet, Rfl: 1    valsartan-hydrochlorothiazide (DIOVAN-HCT) 160-25 MG per tablet, Take 1 tablet by mouth Daily., Disp: 90 tablet, Rfl: 1    Past Medical History:   Diagnosis Date    Sanders syndrome     Cancer 2018    Basal cell removed from nose    Cholelithiasis     Removed 5/8/24    Diverticulosis     GERD (gastroesophageal reflux disease) 2021    Hyperlipidemia     Hypertension     Sleep apnea           Patient Active Problem List   Diagnosis    Dysfunction of both eustachian tubes    Epiretinal membrane (ERM) of  both eyes    Bilateral retinoschisis    Left serous otitis media    Nuclear senile cataract    Perforation of left tympanic membrane    Tinnitus of left ear    Encounter for wellness examination in adult    Encounter for screening mammogram for malignant neoplasm of breast    Menopausal syndrome    History of COVID-19    Right upper quadrant abdominal pain    Tachycardia    Middle ear effusion, bilateral    Left foot pain    Acute left ankle pain    Preop examination    Hypokalemia    Hyperlipidemia    GERD (gastroesophageal reflux disease)    Diverticulosis    Cholelithiasis    Sanders syndrome    Hypertension    Age-related osteoporosis without current pathological fracture    Vitamin D deficiency    Family history of early CAD    GABRIELA (obstructive sleep apnea)    Medicare annual wellness visit, subsequent    Abnormal stress test    Precordial chest pain    Palpitations        Family History   Problem Relation Age of Onset    Stroke Mother 80    Arthritis Mother     Hyperlipidemia Mother     Hypertension Mother     Heart attack Father 55    Heart disease Father     Hyperlipidemia Father     Hypertension Father     Stroke Maternal Grandmother     Heart attack Maternal Grandfather     Diabetes Paternal Grandmother     Stroke Paternal Grandfather     Cancer Sister         melanoma         family history includes Arthritis in her mother; Cancer in her sister; Diabetes in her paternal grandmother; Heart attack in her maternal grandfather; Heart attack (age of onset: 55) in her father; Heart disease in her father; Hyperlipidemia in her father and mother; Hypertension in her father and mother; Stroke in her maternal grandmother and paternal grandfather; Stroke (age of onset: 80) in her mother.     Social History     Socioeconomic History    Marital status:      Spouse name: Ed    Number of children: 1   Tobacco Use    Smoking status: Never     Passive exposure: Never    Smokeless tobacco: Never   Vaping Use     "Vaping status: Never Used   Substance and Sexual Activity    Alcohol use: Yes     Alcohol/week: 2.0 standard drinks of alcohol     Types: 1 Glasses of wine, 1 Cans of beer per week    Drug use: Never    Sexual activity: Yes     Partners: Male     Birth control/protection: Post-menopausal             Vital Signs:  /71 (BP Location: Left arm, Patient Position: Sitting, Cuff Size: Adult)   Pulse 72   Ht 162.6 cm (64\")   Wt 60.3 kg (133 lb)   SpO2 97%   BMI 22.83 kg/m²   Vitals:    01/17/25 1329   Patient Position: Sitting      Estimated body mass index is 22.83 kg/m² as calculated from the following:    Height as of this encounter: 162.6 cm (64\").    Weight as of this encounter: 60.3 kg (133 lb).     Physical Exam  Vitals reviewed.   Constitutional:       General: She is not in acute distress.  HENT:      Head: Normocephalic.   Eyes:      General: No scleral icterus.  Cardiovascular:      Rate and Rhythm: Normal rate.   Pulmonary:      Effort: Pulmonary effort is normal.   Musculoskeletal:         General: Normal range of motion.      Cervical back: Normal range of motion.   Skin:     General: Skin is warm and dry.   Neurological:      Mental Status: She is alert and oriented to person, place, and time.   Psychiatric:         Mood and Affect: Mood normal.         Thought Content: Thought content normal.             Assessment and Plan     Diagnoses and all orders for this visit:    1. Abnormal stress test  Assessment & Plan:  Ok to stop ASA. 12/18/24: Normal LHC at [facility].    Orders:  -     carvedilol (COREG) 3.125 MG tablet; Take 1 tablet by mouth Daily.  Dispense: 90 tablet; Refill: 1    2. Mixed hyperlipidemia  Assessment & Plan:  LHC with NCA so LDL goal less than 100    Continue Rosuvastatin 5mg qhs      3. Primary hypertension  Assessment & Plan:  Hypertension is stable and controlled  Continue current treatment regimen.  Blood pressure will be reassessed in 6 months.         4. GABRIELA (obstructive sleep " apnea)  Assessment & Plan:  Unable to tolerate CPAP  Doing well with dental device from [facility] repeating HST with dental device      5. Palpitations  Assessment & Plan:  Unable to tolerate Metoprolol - caused chest pain  Doing well with Carvedilol once a day (not 2x daily)      6. Precordial chest pain  Assessment & Plan:  [facility] with NCA  Unable to tolerate Metoprolol - caused chest pain  Doing well with Carvedilol once a day (not 2x daily)      7. Tachycardia  Assessment & Plan:  Unable to tolerate Metoprolol - caused chest pain  Doing well with Carvedilol once a day (not 2x daily)      Other orders  -     dilTIAZem XR (DILACOR XR) 180 MG 24 hr capsule; Take 1 capsule by mouth Daily.  Dispense: 90 capsule; Refill: 1  -     omeprazole (priLOSEC) 20 MG capsule; Take 1 capsule by mouth Every Morning.  Dispense: 90 capsule; Refill: 3  -     rosuvastatin (CRESTOR) 5 MG tablet; Take 1 tablet by mouth Daily.  Dispense: 90 tablet; Refill: 1  -     valsartan-hydrochlorothiazide (DIOVAN-HCT) 160-25 MG per tablet; Take 1 tablet by mouth Daily.  Dispense: 90 tablet; Refill: 1          Recommendations: ER if symptoms increase, Report if any new/changing symptoms immediately, Sleep risks reviewed (driving, medical, sleep death, sedating agents), Sleep hygiene discussed, and GABRIELA precautions for anesthesia    Follow Up  Return in about 6 months (around 7/17/2025) for Recheck.    Nery Flaherty, MIKEL   01/17/2025     Please note that this explicitly excludes time spent on other separate billable services such as performing procedures or test interpretation, when applicable.  This note was created using dictation software which occasionally transcribes nonsensical phrases. Please contact the provider if any clarification is needed.

## 2025-01-17 NOTE — ASSESSMENT & PLAN NOTE
Unable to tolerate Metoprolol - caused chest pain  Doing well with Carvedilol once a day (not 2x daily)

## 2025-02-21 RX ORDER — VALSARTAN AND HYDROCHLOROTHIAZIDE 160; 25 MG/1; MG/1
1 TABLET ORAL DAILY
Qty: 90 TABLET | Refills: 1 | Status: CANCELLED | OUTPATIENT
Start: 2025-02-21

## 2025-02-21 RX ORDER — VALSARTAN AND HYDROCHLOROTHIAZIDE 160; 25 MG/1; MG/1
1 TABLET ORAL DAILY
Qty: 90 TABLET | Refills: 1 | Status: SHIPPED | OUTPATIENT
Start: 2025-02-21

## 2025-03-06 RX ORDER — OMEPRAZOLE 20 MG/1
20 CAPSULE, DELAYED RELEASE ORAL EVERY MORNING
Qty: 90 CAPSULE | Refills: 3 | Status: SHIPPED | OUTPATIENT
Start: 2025-03-06

## 2025-03-06 NOTE — TELEPHONE ENCOUNTER
DEXA SCAN FROM 2023 SHOWED OSTEOPENIA. PROTOCOL DICTATES THAT PATIENT BE SWITCHED TO DIFFERENT PPI. PLEASE ADVISE

## 2025-03-17 DIAGNOSIS — R94.39 ABNORMAL STRESS TEST: ICD-10-CM

## 2025-03-17 RX ORDER — DILTIAZEM HYDROCHLORIDE 180 MG/1
180 CAPSULE, EXTENDED RELEASE ORAL DAILY
Qty: 90 CAPSULE | Refills: 1 | Status: SHIPPED | OUTPATIENT
Start: 2025-03-17

## 2025-03-17 RX ORDER — CARVEDILOL 3.12 MG/1
3.12 TABLET ORAL DAILY
Qty: 30 TABLET | Refills: 1 | Status: SHIPPED | OUTPATIENT
Start: 2025-03-17

## 2025-05-16 DIAGNOSIS — R94.39 ABNORMAL STRESS TEST: ICD-10-CM

## 2025-05-16 RX ORDER — CARVEDILOL 3.12 MG/1
3.12 TABLET ORAL DAILY
Qty: 90 TABLET | Refills: 0 | Status: SHIPPED | OUTPATIENT
Start: 2025-05-16

## 2025-07-18 ENCOUNTER — PATIENT ROUNDING (BHMG ONLY) (OUTPATIENT)
Dept: CARDIOLOGY | Facility: CLINIC | Age: 70
End: 2025-07-18
Payer: MEDICARE

## 2025-07-18 ENCOUNTER — OFFICE VISIT (OUTPATIENT)
Age: 70
End: 2025-07-18
Payer: MEDICARE

## 2025-07-18 VITALS
HEIGHT: 64 IN | HEART RATE: 65 BPM | BODY MASS INDEX: 22.53 KG/M2 | SYSTOLIC BLOOD PRESSURE: 133 MMHG | DIASTOLIC BLOOD PRESSURE: 67 MMHG | WEIGHT: 132 LBS | OXYGEN SATURATION: 98 %

## 2025-07-18 DIAGNOSIS — I10 PRIMARY HYPERTENSION: ICD-10-CM

## 2025-07-18 DIAGNOSIS — R00.0 TACHYCARDIA: ICD-10-CM

## 2025-07-18 DIAGNOSIS — R00.2 PALPITATIONS: ICD-10-CM

## 2025-07-18 DIAGNOSIS — G47.33 OSA (OBSTRUCTIVE SLEEP APNEA): ICD-10-CM

## 2025-07-18 DIAGNOSIS — E78.2 MIXED HYPERLIPIDEMIA: ICD-10-CM

## 2025-07-18 PROCEDURE — 99214 OFFICE O/P EST MOD 30 MIN: CPT | Performed by: NURSE PRACTITIONER

## 2025-07-18 PROCEDURE — 3075F SYST BP GE 130 - 139MM HG: CPT | Performed by: NURSE PRACTITIONER

## 2025-07-18 PROCEDURE — 3078F DIAST BP <80 MM HG: CPT | Performed by: NURSE PRACTITIONER

## 2025-07-18 RX ORDER — OMEPRAZOLE 20 MG/1
20 CAPSULE, DELAYED RELEASE ORAL EVERY MORNING
Qty: 90 CAPSULE | Refills: 1 | Status: SHIPPED | OUTPATIENT
Start: 2025-07-18

## 2025-07-18 RX ORDER — CARVEDILOL 3.12 MG/1
3.12 TABLET ORAL DAILY
Qty: 90 TABLET | Refills: 1 | Status: SHIPPED | OUTPATIENT
Start: 2025-07-18

## 2025-07-18 RX ORDER — VALSARTAN AND HYDROCHLOROTHIAZIDE 160; 25 MG/1; MG/1
1 TABLET ORAL DAILY
Qty: 90 TABLET | Refills: 1 | Status: SHIPPED | OUTPATIENT
Start: 2025-07-18

## 2025-07-18 RX ORDER — ROSUVASTATIN CALCIUM 5 MG/1
5 TABLET, COATED ORAL DAILY
Qty: 90 TABLET | Refills: 1 | Status: SHIPPED | OUTPATIENT
Start: 2025-07-18

## 2025-07-18 RX ORDER — DILTIAZEM HYDROCHLORIDE 180 MG/1
180 CAPSULE, EXTENDED RELEASE ORAL DAILY
Qty: 90 CAPSULE | Refills: 1 | Status: SHIPPED | OUTPATIENT
Start: 2025-07-18

## 2025-07-18 NOTE — PROGRESS NOTES
Date:   2025   Name: Lorena Zavala  :   1955  PCP: Bonita Vaz DO  REF:    No ref. provider found     [facility] Provider Note         ..Sleep Apnea         History of Present Illness  The patient is a 70-year-old female who presents for a 6-month follow-up on obstructive sleep apnea (GABRIELA) and tachycardia with palpitations.    She reports experiencing palpitations approximately once every 2 to 3 months, which is less frequent than before. She no longer experiences episodes of waking up at night with a racing heart. Her current medication regimen includes carvedilol once daily and diltiazem. She believes the carvedilol is beneficial, even though she typically does not tolerate beta blockers well. She has not experienced any side effects from the beta blocker and notes that her heart rate appears to be lower. She is able to engage in physical activities such as walking around the building.     She consumes two cups of coffee in the morning before work and tries to avoid it after 3:00 PM. She has noticed that overconsumption of caffeine or stress can trigger her symptoms, which usually resolve within a day or two. She is requesting a 90-day refill of her carvedilol prescription.    She occasionally forgets to use her dentist made dental device due to fatigue, which can lead to awakenings during the night. She also experiences occasional insomnia, waking up at 3:00 AM and taking 1.5 to 2 hours to fall back asleep, but this is not a frequent occurrence.    She was surprised to find her blood pressure reading at 133 today, which she feels is higher than her usual readings at home. She attributes this to a high-salt meal she had the previous night.    Declines repeat HST with dental device. Can always let us know if she changes her mind.    She feels like her sleep issues are decreasing she does not use a PAP machine but she uses her dental device that was  made by her dentist she denies excessive daytime sleepiness.  She said she has not had a car accident or near miss because of falling asleep driving.  She does not take naps.  She does not smoke and she drinks alcohol 2-3 times a week.    SOCIAL HISTORY  Marital Status:   Exercise: Walks around the building  Coffee/Tea/Caffeine-containing Drinks: Drinks coffee in the morning before wo    Dunn Scale is (out of 24): Total score: 0       The 10-year ASCVD risk score (Tamera GUAMAN, et al., 2019) is: 9.8%    Values used to calculate the score:      Age: 70 years      Sex: Female      Is Non- : No      Diabetic: No      Tobacco smoker: No      Systolic Blood Pressure: 133 mmHg      Is BP treated: Yes      HDL Cholesterol: 71 mg/dL      Total Cholesterol: 180 mg/dL     Cardiology and Sleep Related History:      1.  Tachycardia  2.  Palpitations  3.  Family history of premature CAD #4 history of COVID  4.  Resistant hypertension  5.  Hyperlipidemia  6.  Hypersomnia  7.  POTS?  8.  GABRIELA  9.  Abnormal stress test followed by [facility] with NCA but did have artery spasms    Declines renal ultrasound    Unable to tolerate metoprolol related to chest pressure.  Has not tried other beta-blockers.  Has done okay as far as side effects with diltiazem but current dose is not controlling palpitations or tachycardia.    12/18/2024 [facility]- NCA but with artery spasms so had to go in femorally.    11/13/2024 exercise stress test-intermediate risk    10/10/2024 HST- Mild GABRIELA becoming moderate in REM sleep.  Overall AHI 12.5, supine 12.5, 21.9 REM.  Data did not reveal evidence of arrhythmia.    Medications Discontinued During This Encounter   Medication Reason    rosuvastatin (CRESTOR) 5 MG tablet Reorder    valsartan-hydrochlorothiazide (DIOVAN-HCT) 160-25 MG per tablet Reorder    omeprazole (priLOSEC) 20 MG capsule Reorder    dilTIAZem XR (DILACOR XR) 180 MG 24 hr capsule Reorder    carvedilol (COREG) 3.125 MG  tablet Reorder        Allergies   Allergen Reactions    Penicillins Unknown - Low Severity    Percocet [Oxycodone-Acetaminophen] Itching    Sulfa Antibiotics Unknown - Low Severity         Current Outpatient Medications:     carvedilol (COREG) 3.125 MG tablet, Take 1 tablet by mouth Daily., Disp: 90 tablet, Rfl: 1    Cholecalciferol (Vitamin D3) 50 MCG (2000 UT) tablet, Take  by mouth., Disp: , Rfl:     dilTIAZem XR (DILACOR XR) 180 MG 24 hr capsule, Take 1 capsule by mouth Daily., Disp: 90 capsule, Rfl: 1    omeprazole (priLOSEC) 20 MG capsule, Take 1 capsule by mouth Every Morning., Disp: 90 capsule, Rfl: 1    rosuvastatin (CRESTOR) 5 MG tablet, Take 1 tablet by mouth Daily., Disp: 90 tablet, Rfl: 1    valsartan-hydrochlorothiazide (DIOVAN-HCT) 160-25 MG per tablet, Take 1 tablet by mouth Daily., Disp: 90 tablet, Rfl: 1    Past Medical History:   Diagnosis Date    Sanders syndrome     Cancer 2018    Basal cell removed from nose    Cholelithiasis     Removed 5/8/24    Diverticulosis     GERD (gastroesophageal reflux disease) 2021    Hyperlipidemia     Hypertension     Sleep apnea           Patient Active Problem List   Diagnosis    Dysfunction of both eustachian tubes    Epiretinal membrane (ERM) of both eyes    Bilateral retinoschisis    Left serous otitis media    Nuclear senile cataract    Perforation of left tympanic membrane    Tinnitus of left ear    Encounter for wellness examination in adult    Encounter for screening mammogram for malignant neoplasm of breast    Menopausal syndrome    History of COVID-19    Right upper quadrant abdominal pain    Tachycardia    Middle ear effusion, bilateral    Left foot pain    Acute left ankle pain    Preop examination    Hypokalemia    Hyperlipidemia    GERD (gastroesophageal reflux disease)    Diverticulosis    Cholelithiasis    Sanders syndrome    Hypertension    Age-related osteoporosis without current pathological fracture    Vitamin D deficiency    Family history of  "early CAD    GABRIELA (obstructive sleep apnea)    Medicare annual wellness visit, subsequent    Abnormal stress test    Precordial chest pain    Palpitations        Family History   Problem Relation Age of Onset    Stroke Mother 80    Arthritis Mother     Hyperlipidemia Mother     Hypertension Mother     Heart attack Father 55    Heart disease Father     Hyperlipidemia Father     Hypertension Father     Stroke Maternal Grandmother     Heart attack Maternal Grandfather     Diabetes Paternal Grandmother     Stroke Paternal Grandfather     Cancer Sister         melanoma         family history includes Arthritis in her mother; Cancer in her sister; Diabetes in her paternal grandmother; Heart attack in her maternal grandfather; Heart attack (age of onset: 55) in her father; Heart disease in her father; Hyperlipidemia in her father and mother; Hypertension in her father and mother; Stroke in her maternal grandmother and paternal grandfather; Stroke (age of onset: 80) in her mother.     Social History     Socioeconomic History    Marital status:      Spouse name: Ed    Number of children: 1   Tobacco Use    Smoking status: Never     Passive exposure: Never    Smokeless tobacco: Never   Vaping Use    Vaping status: Never Used   Substance and Sexual Activity    Alcohol use: Yes     Alcohol/week: 2.0 standard drinks of alcohol     Types: 1 Glasses of wine, 1 Cans of beer per week    Drug use: Never    Sexual activity: Yes     Partners: Male     Birth control/protection: Post-menopausal             Vital Signs:  /67 (BP Location: Left arm, Patient Position: Sitting, Cuff Size: Adult)   Pulse 65   Ht 162.6 cm (64\")   Wt 59.9 kg (132 lb)   SpO2 98%   BMI 22.66 kg/m²   Estimated body mass index is 22.66 kg/m² as calculated from the following:    Height as of this encounter: 162.6 cm (64\").    Weight as of this encounter: 59.9 kg (132 lb).  Physical Exam  Vitals reviewed.   Constitutional:       Appearance: Normal " appearance. She is well-developed.   HENT:      Head: Normocephalic and atraumatic.   Eyes:      General: No scleral icterus.     Pupils: Pupils are equal, round, and reactive to light.   Cardiovascular:      Rate and Rhythm: Normal rate and regular rhythm.      Heart sounds: Normal heart sounds. No murmur heard.  Pulmonary:      Breath sounds: Normal breath sounds. No wheezing or rhonchi.   Musculoskeletal:         General: Normal range of motion.      Right lower leg: No edema.      Left lower leg: No edema.   Skin:     General: Skin is warm and dry.      Capillary Refill: Capillary refill takes less than 2 seconds.      Coloration: Skin is not cyanotic.      Nails: There is no clubbing.   Neurological:      Mental Status: She is alert and oriented to person, place, and time.      Motor: No weakness.      Gait: Gait normal.   Psychiatric:         Mood and Affect: Mood normal.         Behavior: Behavior is cooperative.         Thought Content: Thought content normal.         Physical Exam  Blood Pressure: 133  Heart: Regular rate and rhythm  Lungs: Clear to auscultation bilaterally    Results            Assessment and Plan     Diagnoses and all orders for this visit:    1. Palpitations  -     carvedilol (COREG) 3.125 MG tablet; Take 1 tablet by mouth Daily.  Dispense: 90 tablet; Refill: 1  -     dilTIAZem XR (DILACOR XR) 180 MG 24 hr capsule; Take 1 capsule by mouth Daily.  Dispense: 90 capsule; Refill: 1    2. Tachycardia  -     carvedilol (COREG) 3.125 MG tablet; Take 1 tablet by mouth Daily.  Dispense: 90 tablet; Refill: 1  -     dilTIAZem XR (DILACOR XR) 180 MG 24 hr capsule; Take 1 capsule by mouth Daily.  Dispense: 90 capsule; Refill: 1    3. GABRIELA (obstructive sleep apnea)    4. Mixed hyperlipidemia  -     rosuvastatin (CRESTOR) 5 MG tablet; Take 1 tablet by mouth Daily.  Dispense: 90 tablet; Refill: 1    5. Primary hypertension  -     carvedilol (COREG) 3.125 MG tablet; Take 1 tablet by mouth Daily.   Dispense: 90 tablet; Refill: 1  -     dilTIAZem XR (DILACOR XR) 180 MG 24 hr capsule; Take 1 capsule by mouth Daily.  Dispense: 90 capsule; Refill: 1  -     valsartan-hydrochlorothiazide (DIOVAN-HCT) 160-25 MG per tablet; Take 1 tablet by mouth Daily.  Dispense: 90 tablet; Refill: 1    Other orders  -     omeprazole (priLOSEC) 20 MG capsule; Take 1 capsule by mouth Every Morning.  Dispense: 90 capsule; Refill: 1        Assessment & Plan  1. Tachycardia with palpitations:  -  Fall 2024 Abnormal stress test followed by [facility] with NCA but did have artery spasms  - Significant improvement noted with palpitations occurring only once every 2 to 3 months.  - Managed with carvedilol once a day (cannot tolerate BID) and diltiazem.  - Advised to take an additional dose of carvedilol if experiencing breakthrough palpitations.  - Prescription for a 90-day supply of carvedilol will be provided.  - Notify the office immediately if experiencing any chest pain or flareup in palpitations.    2. Obstructive sleep apnea (GABRIELA):  - Reports no longer waking up at night with a racing heart, indicating improvement in GABRIELA symptoms.  - Uses a dental device from dentist to manage sleep apnea.  - Advised to continue using the dental device consistently.  -Let us know if she ever wants to repeat HST with dental device    3. Elevated blood pressure:  - Blood pressure noted to be 133, attributed to a high-salt meal the previous night.  - Advised to monitor blood pressure at home.  - Reduce salt intake.  -Continue current medication regimen  -Let us know if she ever wants to move forward with a renal artery ultrasound    Follow-up: The patient will follow up in 6 months.    Recommendations: ER if symptoms increase, Report if any new/changing symptoms immediately, Limit salt, Limit caffeine, Sleep risks reviewed (driving, medical, sleep death, sedating agents), Sleep hygiene discussed, and GABRIELA precautions for anesthesia    Follow Up  Return in about  6 months (around 1/18/2026) for cardiac/GABRIELA.    Patient or patient representative verbalized consent for the use of Ambient Listening during the visit with  MIKEL Ruiz for chart documentation. 7/18/2025  18:03 EDT    MIKEL Ruiz   07/18/2025     Please note that this explicitly excludes time spent on other separate billable services such as performing procedures or test interpretation, when applicable.  This note was created using dictation software which occasionally transcribes nonsensical phrases. Please contact the provider if any clarification is needed.

## 2025-07-18 NOTE — PROGRESS NOTES
..My name is Kyung Bailey and I am the Practice Manager for [facility] [place].    I would like to thank you for being a loyal patient. If you do not mind I would like to ask you a few questions about your recent visit with us.  Please feel free to reply if you wish to provide us with feedback on your first visit with our practice.    First, could you tell me what went well with your recent visit?    Secondly, we are always looking for ways to make our patients' experiences even better.  Do you have any recommendations on ways we may improve?    Finally, overall were you satisfied with your first visit to us as a [facility] facility?    In the next few days, you will be receiving a Patient Experience Survey.      Thank you for taking the time to answer a few questions today.  I hope you have a good day.

## 2025-08-15 DIAGNOSIS — I10 PRIMARY HYPERTENSION: ICD-10-CM

## 2025-08-15 RX ORDER — VALSARTAN AND HYDROCHLOROTHIAZIDE 160; 25 MG/1; MG/1
1 TABLET ORAL DAILY
Qty: 90 TABLET | Refills: 1 | Status: SHIPPED | OUTPATIENT
Start: 2025-08-15

## (undated) DEVICE — CATH DIAG EXPO .045 FL3  5F 100CM

## (undated) DEVICE — GLIDESHEATH SLENDER STAINLESS STEEL KIT: Brand: GLIDESHEATH SLENDER

## (undated) DEVICE — MODEL BT2000 P/N 700287-012KIT CONTENTS: MANIFOLD WITH SALINE AND CONTRAST PORTS, SALINE TUBING WITH SPIKE AND HAND SYRINGE, TRANSDUCER: Brand: BT2000 AUTOMATED MANIFOLD KIT

## (undated) DEVICE — CVR PROB ULTRASND/TRANSD W/GEL 7X11IN STRL

## (undated) DEVICE — ANGIO-SEAL VIP VASCULAR CLOSURE DEVICE: Brand: ANGIO-SEAL

## (undated) DEVICE — PINNACLE INTRODUCER SHEATH: Brand: PINNACLE

## (undated) DEVICE — MODEL AT P65, P/N 701554-001KIT CONTENTS: HAND CONTROLLER, 3-WAY HIGH-PRESSURE STOPCOCK WITH ROTATING END AND PREMIUM HIGH-PRESSURE TUBING: Brand: ANGIOTOUCH® KIT

## (undated) DEVICE — GW PERIPH VASC ADX J/TP SS .035 150CM 3MM

## (undated) DEVICE — PK CATH CARD 10

## (undated) DEVICE — ADULT, W/LG. BACK PAD, RADIOTRANSPARENT ELEMENT AND LEAD WIRE COMPATIBLE W/: Brand: DEFIBRILLATION ELECTRODES

## (undated) DEVICE — CATH DIAG EXPO M/ PK 5F FL4/FR4 PIG

## (undated) DEVICE — ST ACC MICROPUNCTURE .018 TRANSLSS/SS/TP 5F/10CM 21G/7CM